# Patient Record
Sex: FEMALE | Race: WHITE | Employment: OTHER | URBAN - NONMETROPOLITAN AREA
[De-identification: names, ages, dates, MRNs, and addresses within clinical notes are randomized per-mention and may not be internally consistent; named-entity substitution may affect disease eponyms.]

---

## 2016-02-15 LAB
CHOLESTEROL, TOTAL: 208 MG/DL
CHOLESTEROL/HDL RATIO: 3.6
HDLC SERPL-MCNC: 58 MG/DL (ref 35–70)
LDL CHOLESTEROL CALCULATED: 121.6 MG/DL (ref 0–160)
TRIGL SERPL-MCNC: 142 MG/DL
VLDLC SERPL CALC-MCNC: 28 MG/DL

## 2017-03-22 VITALS — DIASTOLIC BLOOD PRESSURE: 80 MMHG | WEIGHT: 178 LBS | SYSTOLIC BLOOD PRESSURE: 112 MMHG | HEART RATE: 60 BPM

## 2017-03-22 RX ORDER — POTASSIUM CHLORIDE 750 MG/1
10 CAPSULE, EXTENDED RELEASE ORAL 2 TIMES DAILY
COMMUNITY
End: 2017-12-13 | Stop reason: SDUPTHER

## 2017-03-22 RX ORDER — SIMVASTATIN 40 MG
40 TABLET ORAL NIGHTLY
COMMUNITY
End: 2017-12-13 | Stop reason: SDUPTHER

## 2017-03-22 RX ORDER — LATANOPROST 50 UG/ML
1 SOLUTION/ DROPS OPHTHALMIC NIGHTLY
COMMUNITY

## 2017-03-22 RX ORDER — LEVOTHYROXINE SODIUM 0.05 MG/1
50 TABLET ORAL DAILY
COMMUNITY
End: 2017-12-13 | Stop reason: SDUPTHER

## 2017-09-09 LAB
AGE FOR GFR: 60
ALT SERPL-CCNC: 47 UNITS/L
ANION GAP SERPL CALCULATED.3IONS-SCNC: 14 MMOL/L
AST SERPL-CCNC: 25 UNITS/L
BUN BLDV-MCNC: 14 MG/DL
CALCIUM SERPL-MCNC: 10.3 MG/DL
CHLORIDE BLD-SCNC: 101 MMOL/L
CHOLESTEROL/HDL RATIO: 3.4 RATIO
CHOLESTEROL: 202 MG/DL
CO2: 30 MMOL/L
CREAT SERPL-MCNC: 0.7 MG/DL
EGFR BF: 103 ML/MIN/1.73 M2
EGFR BM: 139 ML/MIN/1.73 M2
EGFR WF: 85 ML/MIN/1.73 M2
EGFR WM: 115 ML/MIN/1.73 M2
GLUCOSE: 106 MG/DL
HDL, DIRECT: 59 MG/DL
LDL CHOLESTEROL CALCULATED: 107.6 MG/DL
POTASSIUM SERPL-SCNC: 3.5 MMOL/L
SODIUM BLD-SCNC: 141 MMOL/L
T4 FREE: 0.92 NG/DL
TRIGL SERPL-MCNC: 177 MG/DL
TSH SERPL DL<=0.05 MIU/L-ACNC: 1.66 MIU/ML
VLDLC SERPL CALC-MCNC: 35 MG/DL

## 2017-09-15 ENCOUNTER — OFFICE VISIT (OUTPATIENT)
Dept: FAMILY MEDICINE CLINIC | Age: 62
End: 2017-09-15
Payer: COMMERCIAL

## 2017-09-15 VITALS
WEIGHT: 177 LBS | DIASTOLIC BLOOD PRESSURE: 70 MMHG | SYSTOLIC BLOOD PRESSURE: 110 MMHG | HEIGHT: 64 IN | HEART RATE: 72 BPM | BODY MASS INDEX: 30.22 KG/M2

## 2017-09-15 DIAGNOSIS — I10 ESSENTIAL HYPERTENSION: ICD-10-CM

## 2017-09-15 DIAGNOSIS — Z12.11 SCREEN FOR COLON CANCER: ICD-10-CM

## 2017-09-15 DIAGNOSIS — Z00.00 WELL ADULT EXAM: Primary | ICD-10-CM

## 2017-09-15 DIAGNOSIS — Z12.31 ENCOUNTER FOR SCREENING MAMMOGRAM FOR MALIGNANT NEOPLASM OF BREAST: ICD-10-CM

## 2017-09-15 DIAGNOSIS — Z23 NEED FOR PROPHYLACTIC VACCINATION AND INOCULATION AGAINST VARICELLA: ICD-10-CM

## 2017-09-15 PROCEDURE — 99396 PREV VISIT EST AGE 40-64: CPT | Performed by: FAMILY MEDICINE

## 2017-09-15 RX ORDER — ATENOLOL AND CHLORTHALIDONE TABLET 50; 25 MG/1; MG/1
1 TABLET ORAL DAILY
Qty: 90 TABLET | Refills: 3 | Status: SHIPPED | OUTPATIENT
Start: 2017-09-15 | End: 2018-03-29 | Stop reason: SDUPTHER

## 2017-09-28 LAB — PAP AGE BASED: NORMAL

## 2017-12-13 RX ORDER — POTASSIUM CHLORIDE 750 MG/1
CAPSULE, EXTENDED RELEASE ORAL
Qty: 180 CAPSULE | Refills: 2 | Status: SHIPPED | OUTPATIENT
Start: 2017-12-13 | End: 2018-03-29 | Stop reason: SDUPTHER

## 2017-12-13 RX ORDER — LEVOTHYROXINE SODIUM 0.05 MG/1
TABLET ORAL
Qty: 90 TABLET | Refills: 3 | Status: SHIPPED | OUTPATIENT
Start: 2017-12-13 | End: 2018-03-29 | Stop reason: SDUPTHER

## 2017-12-13 RX ORDER — SIMVASTATIN 40 MG
TABLET ORAL
Qty: 90 TABLET | Refills: 3 | Status: SHIPPED | OUTPATIENT
Start: 2017-12-13 | End: 2018-03-29 | Stop reason: SDUPTHER

## 2018-02-01 ENCOUNTER — TELEPHONE (OUTPATIENT)
Dept: FAMILY MEDICINE CLINIC | Age: 63
End: 2018-02-01

## 2018-03-29 ENCOUNTER — OFFICE VISIT (OUTPATIENT)
Dept: FAMILY MEDICINE CLINIC | Age: 63
End: 2018-03-29
Payer: COMMERCIAL

## 2018-03-29 VITALS
DIASTOLIC BLOOD PRESSURE: 80 MMHG | HEIGHT: 64 IN | BODY MASS INDEX: 27.49 KG/M2 | HEART RATE: 80 BPM | SYSTOLIC BLOOD PRESSURE: 118 MMHG | WEIGHT: 161 LBS

## 2018-03-29 DIAGNOSIS — E03.9 ACQUIRED HYPOTHYROIDISM: ICD-10-CM

## 2018-03-29 DIAGNOSIS — E78.2 MIXED HYPERLIPIDEMIA: ICD-10-CM

## 2018-03-29 DIAGNOSIS — I10 ESSENTIAL HYPERTENSION: Primary | ICD-10-CM

## 2018-03-29 PROCEDURE — 3017F COLORECTAL CA SCREEN DOC REV: CPT | Performed by: FAMILY MEDICINE

## 2018-03-29 PROCEDURE — 99214 OFFICE O/P EST MOD 30 MIN: CPT | Performed by: FAMILY MEDICINE

## 2018-03-29 PROCEDURE — G8419 CALC BMI OUT NRM PARAM NOF/U: HCPCS | Performed by: FAMILY MEDICINE

## 2018-03-29 PROCEDURE — 1036F TOBACCO NON-USER: CPT | Performed by: FAMILY MEDICINE

## 2018-03-29 PROCEDURE — 3014F SCREEN MAMMO DOC REV: CPT | Performed by: FAMILY MEDICINE

## 2018-03-29 PROCEDURE — G8427 DOCREV CUR MEDS BY ELIG CLIN: HCPCS | Performed by: FAMILY MEDICINE

## 2018-03-29 PROCEDURE — G8484 FLU IMMUNIZE NO ADMIN: HCPCS | Performed by: FAMILY MEDICINE

## 2018-03-29 RX ORDER — POTASSIUM CHLORIDE 750 MG/1
20 CAPSULE, EXTENDED RELEASE ORAL DAILY
Qty: 180 CAPSULE | Refills: 3 | Status: SHIPPED | OUTPATIENT
Start: 2018-03-29 | End: 2019-04-10 | Stop reason: SDUPTHER

## 2018-03-29 RX ORDER — LEVOTHYROXINE SODIUM 0.05 MG/1
50 TABLET ORAL DAILY
Qty: 90 TABLET | Refills: 3 | Status: SHIPPED | OUTPATIENT
Start: 2018-03-29 | End: 2019-04-10 | Stop reason: SDUPTHER

## 2018-03-29 RX ORDER — ATORVASTATIN CALCIUM 40 MG/1
40 TABLET, FILM COATED ORAL DAILY
Qty: 90 TABLET | Refills: 3 | Status: SHIPPED | OUTPATIENT
Start: 2018-03-29 | End: 2018-11-01 | Stop reason: ALTCHOICE

## 2018-03-29 RX ORDER — ATENOLOL AND CHLORTHALIDONE TABLET 50; 25 MG/1; MG/1
1 TABLET ORAL DAILY
Qty: 90 TABLET | Refills: 3 | Status: SHIPPED | OUTPATIENT
Start: 2018-03-29 | End: 2019-04-10 | Stop reason: SDUPTHER

## 2018-03-29 RX ORDER — SIMVASTATIN 40 MG
TABLET ORAL
Qty: 90 TABLET | Refills: 3 | Status: SHIPPED | OUTPATIENT
Start: 2018-03-29 | End: 2018-03-29

## 2018-03-29 ASSESSMENT — PATIENT HEALTH QUESTIONNAIRE - PHQ9
SUM OF ALL RESPONSES TO PHQ9 QUESTIONS 1 & 2: 0
2. FEELING DOWN, DEPRESSED OR HOPELESS: 0
SUM OF ALL RESPONSES TO PHQ QUESTIONS 1-9: 0
1. LITTLE INTEREST OR PLEASURE IN DOING THINGS: 0

## 2018-03-29 ASSESSMENT — ENCOUNTER SYMPTOMS
SHORTNESS OF BREATH: 0
DIARRHEA: 0
ORTHOPNEA: 0
ABDOMINAL PAIN: 0
BLURRED VISION: 0
CONSTIPATION: 0

## 2018-03-29 NOTE — PROGRESS NOTES
frequency and urgency. Musculoskeletal: Negative for arthralgias and myalgias. Neurological: Negative for dizziness and light-headedness. Psychiatric/Behavioral: Negative for sleep disturbance. Objective:     /80   Pulse 80   Ht 5' 3.5\" (1.613 m)   Wt 161 lb (73 kg)   BMI 28.07 kg/m²     Physical Exam   Constitutional: She is oriented to person, place, and time. She appears well-developed and well-nourished. HENT:   Head: Normocephalic and atraumatic. Eyes: Conjunctivae and EOM are normal.   Neck: Normal range of motion. Neck supple. No JVD present. No thyromegaly present. Cardiovascular: Normal rate, regular rhythm and intact distal pulses. Exam reveals no gallop and no friction rub. No murmur heard. Pulmonary/Chest: Effort normal and breath sounds normal. No respiratory distress. Abdominal: Soft. Musculoskeletal: She exhibits no edema. Lymphadenopathy:     She has no cervical adenopathy. Neurological: She is alert and oriented to person, place, and time. Skin: Skin is warm. Psychiatric: She has a normal mood and affect. Her behavior is normal. Judgment and thought content normal.   Nursing note and vitals reviewed. Assessment/Plan:     1. Essential hypertension  atenolol-chlorthalidone (TENORETIC 50) 50-25 MG per tablet   2. Mixed hyperlipidemia  atorvastatin (LIPITOR) 40 MG tablet   3. Acquired hypothyroidism       Will change to the atorvastatin in light of the \"free\" promo with area drug store. Also referred to area Health screenings- dates given for low cost labs this fall. Will do well visit in the fall and recheck of the labs at that time. Encouraged on her current healthy habits.     Lab Results   Component Value Date    CHOL 202 (H) 09/09/2017    TRIG 177 09/09/2017    HDL 58 02/15/2016    ALT 47 09/09/2017    AST 25 09/09/2017     09/09/2017    K 3.5 (L) 09/09/2017     09/09/2017    CREATININE 0.7 09/09/2017    BUN 14 09/09/2017    CO2 30

## 2018-08-23 LAB — GLUCOSE FASTING: 123 MG/DL

## 2018-09-25 ENCOUNTER — TELEPHONE (OUTPATIENT)
Dept: FAMILY MEDICINE CLINIC | Age: 63
End: 2018-09-25

## 2018-09-25 DIAGNOSIS — R74.8 ABNORMAL LIVER ENZYMES: Primary | ICD-10-CM

## 2018-10-01 LAB
A/G RATIO: 1.3 RATIO
ALBUMIN: 4.4 G/DL
ALK PHOSPHATASE: 306 UNITS/L
ALT SERPL-CCNC: 253 UNITS/L
AST SERPL-CCNC: 142 UNITS/L
BILIRUB SERPL-MCNC: 0.6 MG/DL
BILIRUBIN DIRECT: 0 MG/DL
GLOBULIN: 3.5 G/DL
TOTAL PROTEIN: 7.9 G/DL

## 2018-10-04 ENCOUNTER — OFFICE VISIT (OUTPATIENT)
Dept: FAMILY MEDICINE CLINIC | Age: 63
End: 2018-10-04
Payer: COMMERCIAL

## 2018-10-04 VITALS
HEART RATE: 66 BPM | WEIGHT: 167 LBS | BODY MASS INDEX: 29.12 KG/M2 | SYSTOLIC BLOOD PRESSURE: 124 MMHG | DIASTOLIC BLOOD PRESSURE: 82 MMHG

## 2018-10-04 DIAGNOSIS — I10 ESSENTIAL HYPERTENSION: ICD-10-CM

## 2018-10-04 DIAGNOSIS — E78.2 MIXED HYPERLIPIDEMIA: ICD-10-CM

## 2018-10-04 DIAGNOSIS — R79.89 ABNORMAL LIVER FUNCTION TEST: Primary | ICD-10-CM

## 2018-10-04 PROCEDURE — G8484 FLU IMMUNIZE NO ADMIN: HCPCS | Performed by: FAMILY MEDICINE

## 2018-10-04 PROCEDURE — 99214 OFFICE O/P EST MOD 30 MIN: CPT | Performed by: FAMILY MEDICINE

## 2018-10-04 PROCEDURE — G8419 CALC BMI OUT NRM PARAM NOF/U: HCPCS | Performed by: FAMILY MEDICINE

## 2018-10-04 PROCEDURE — 1036F TOBACCO NON-USER: CPT | Performed by: FAMILY MEDICINE

## 2018-10-04 PROCEDURE — 3017F COLORECTAL CA SCREEN DOC REV: CPT | Performed by: FAMILY MEDICINE

## 2018-10-04 PROCEDURE — G8427 DOCREV CUR MEDS BY ELIG CLIN: HCPCS | Performed by: FAMILY MEDICINE

## 2018-10-04 ASSESSMENT — ENCOUNTER SYMPTOMS
RESPIRATORY NEGATIVE: 1
GASTROINTESTINAL NEGATIVE: 1

## 2018-10-04 NOTE — PROGRESS NOTES
back pain    Kidney Disease Brother     Cancer Brother         lung       Social History   Substance Use Topics    Smoking status: Never Smoker    Smokeless tobacco: Never Used    Alcohol use No      Current Outpatient Prescriptions   Medication Sig Dispense Refill    levothyroxine (SYNTHROID) 50 MCG tablet Take 1 tablet by mouth daily 90 tablet 3    potassium chloride (MICRO-K) 10 MEQ extended release capsule Take 2 capsules by mouth daily 180 capsule 3    atenolol-chlorthalidone (TENORETIC 50) 50-25 MG per tablet Take 1 tablet by mouth daily 90 tablet 3    atorvastatin (LIPITOR) 40 MG tablet Take 1 tablet by mouth daily 90 tablet 3    latanoprost (XALATAN) 0.005 % ophthalmic solution 1 drop nightly      Nutritional Supplements (ESTROVEN NIGHTTIME PO) Take by mouth       No current facility-administered medications for this visit. Allergies   Allergen Reactions    Pneumococcal Vaccines      Arm swelling , red       Health Maintenance   Topic Date Due    Hepatitis C screen  1955    HIV screen  09/22/1971    Diabetes screen  09/22/1996    Shingles Vaccine (1 of 2 - 2 Dose Series) 09/22/2006    Colon cancer screen colonoscopy  09/22/2006    Cervical cancer screen  12/21/2015    Breast cancer screen  02/15/2018    Flu vaccine (1) 09/01/2018    DTaP/Tdap/Td vaccine (2 - Tdap) 09/25/2019    Lipid screen  08/23/2023       Subjective:      Review of Systems  Constitutional: Negative. Respiratory: Negative. Cardiovascular: Negative. Gastrointestinal: Negative. Genitourinary: Negative. Musculoskeletal: Positive for myalgias (right leg starts at the top and radiates down). Neurological: Negative. Objective:     /82   Pulse 66   Wt 167 lb (75.8 kg)   BMI 29.12 kg/m²     Physical Exam   Constitutional: She is oriented to person, place, and time. She appears well-developed and well-nourished. HENT:   Head: Normocephalic and atraumatic.    Eyes: Conjunctivae and EOM prescribed medications. All patient questions answered. Pt voiced understanding. Reviewed health maintenance. Instructed to continue current medications, diet and exercise. Patient agreed with treatment plan. Follow up as directed.      Electronically signed by Jacinta Ward MD on 10/4/2018

## 2018-10-12 LAB
A/G RATIO: 1.2 RATIO
ALBUMIN: 4.8 G/DL
ALK PHOSPHATASE: 275 UNITS/L
ALT SERPL-CCNC: 191 UNITS/L
AST SERPL-CCNC: 94 UNITS/L
BILIRUB SERPL-MCNC: 0.8 MG/DL
BILIRUBIN DIRECT: 0 MG/DL
GLOBULIN: 4 G/DL
HAV IGM SER IA-ACNC: NORMAL
HEPATITIS B CORE IGM ANTIBODY: NORMAL
HEPATITIS B SURFACE ANTIGEN: NORMAL
HEPATITIS C IGG: NORMAL
Lab: NORMAL
SIGNAL/CUTOFF: NORMAL
TOTAL PROTEIN: 8.8 G/DL

## 2018-10-26 LAB
% SATURATION: 37 %
ABNORMAL PROTEIN BAND 1: NORMAL
ABNORMAL PROTEIN BAND 2: NORMAL
ABNORMAL PROTEIN BAND 3: NORMAL
ADDITIONAL TESTING: NORMAL
ALBUMIN: NORMAL
ALPHA 1 GLOBULIN: NORMAL
ALPHA 2 GLOBULIN: NORMAL
ANA SCREEN: NORMAL
BETA GLOBULIN: NORMAL
GAMMA GLOBULIN: NORMAL
INTERPRETATION: NORMAL
IRON: 121 MG/DL
TOTAL IRON BINDING CAPACITY: 327 MG/DL
TSH REFLEX FT4: 2.21 MIU/ML

## 2018-11-01 ENCOUNTER — OFFICE VISIT (OUTPATIENT)
Dept: FAMILY MEDICINE CLINIC | Age: 63
End: 2018-11-01
Payer: COMMERCIAL

## 2018-11-01 VITALS
SYSTOLIC BLOOD PRESSURE: 122 MMHG | HEART RATE: 78 BPM | BODY MASS INDEX: 29.12 KG/M2 | DIASTOLIC BLOOD PRESSURE: 76 MMHG | WEIGHT: 167 LBS

## 2018-11-01 DIAGNOSIS — R94.5 ABNORMAL LIVER FUNCTION: Primary | ICD-10-CM

## 2018-11-01 PROCEDURE — G8419 CALC BMI OUT NRM PARAM NOF/U: HCPCS | Performed by: FAMILY MEDICINE

## 2018-11-01 PROCEDURE — 99214 OFFICE O/P EST MOD 30 MIN: CPT | Performed by: FAMILY MEDICINE

## 2018-11-01 PROCEDURE — G8484 FLU IMMUNIZE NO ADMIN: HCPCS | Performed by: FAMILY MEDICINE

## 2018-11-01 PROCEDURE — 3017F COLORECTAL CA SCREEN DOC REV: CPT | Performed by: FAMILY MEDICINE

## 2018-11-01 PROCEDURE — G8427 DOCREV CUR MEDS BY ELIG CLIN: HCPCS | Performed by: FAMILY MEDICINE

## 2018-11-01 PROCEDURE — 1036F TOBACCO NON-USER: CPT | Performed by: FAMILY MEDICINE

## 2018-11-04 ASSESSMENT — ENCOUNTER SYMPTOMS
CHEST TIGHTNESS: 0
CHOKING: 0
COUGH: 0
SHORTNESS OF BREATH: 0

## 2019-03-01 LAB
A/G RATIO: 1.4 RATIO
ALBUMIN: 4.8 G/DL
ALK PHOSPHATASE: 90 UNITS/L
ALT SERPL-CCNC: 36 UNITS/L
AST SERPL-CCNC: 31 UNITS/L
BILIRUB SERPL-MCNC: 0.8 MG/DL
BILIRUBIN DIRECT: 0 MG/DL
GLOBULIN: 3.5 G/DL
TOTAL PROTEIN: 8.3 G/DL

## 2019-04-10 DIAGNOSIS — I10 ESSENTIAL HYPERTENSION: ICD-10-CM

## 2019-04-10 RX ORDER — ATENOLOL AND CHLORTHALIDONE TABLET 50; 25 MG/1; MG/1
1 TABLET ORAL DAILY
Qty: 90 TABLET | Refills: 3 | Status: SHIPPED | OUTPATIENT
Start: 2019-04-10 | End: 2020-04-27

## 2019-04-10 RX ORDER — LEVOTHYROXINE SODIUM 0.05 MG/1
50 TABLET ORAL DAILY
Qty: 90 TABLET | Refills: 3 | Status: SHIPPED | OUTPATIENT
Start: 2019-04-10 | End: 2020-04-06

## 2019-04-10 RX ORDER — POTASSIUM CHLORIDE 750 MG/1
20 CAPSULE, EXTENDED RELEASE ORAL DAILY
Qty: 180 CAPSULE | Refills: 3 | Status: SHIPPED | OUTPATIENT
Start: 2019-04-10 | End: 2020-04-06

## 2019-04-10 NOTE — TELEPHONE ENCOUNTER
John Matson is calling to request a refill on the following medication(s):  Requested Prescriptions     Pending Prescriptions Disp Refills    atenolol-chlorthalidone (TENORETIC 50) 50-25 MG per tablet 90 tablet 3     Sig: Take 1 tablet by mouth daily    levothyroxine (SYNTHROID) 50 MCG tablet 90 tablet 3     Sig: Take 1 tablet by mouth daily    potassium chloride (MICRO-K) 10 MEQ extended release capsule 180 capsule 3     Sig: Take 2 capsules by mouth daily       Last Visit Date (If Applicable):  05/0/2386    Next Visit Date:    5/9/2019

## 2019-05-29 RX ORDER — ATORVASTATIN CALCIUM 40 MG/1
40 TABLET, FILM COATED ORAL
COMMUNITY
Start: 2018-03-29 | End: 2019-05-30 | Stop reason: SINTOL

## 2019-05-30 ENCOUNTER — OFFICE VISIT (OUTPATIENT)
Dept: FAMILY MEDICINE CLINIC | Age: 64
End: 2019-05-30
Payer: COMMERCIAL

## 2019-05-30 VITALS
BODY MASS INDEX: 29.41 KG/M2 | HEART RATE: 55 BPM | WEIGHT: 166 LBS | OXYGEN SATURATION: 98 % | HEIGHT: 63 IN | SYSTOLIC BLOOD PRESSURE: 104 MMHG | DIASTOLIC BLOOD PRESSURE: 78 MMHG

## 2019-05-30 DIAGNOSIS — E78.2 MIXED HYPERLIPIDEMIA: ICD-10-CM

## 2019-05-30 DIAGNOSIS — R94.5 ABNORMAL LIVER FUNCTION: ICD-10-CM

## 2019-05-30 DIAGNOSIS — Z12.31 ENCOUNTER FOR SCREENING MAMMOGRAM FOR MALIGNANT NEOPLASM OF BREAST: ICD-10-CM

## 2019-05-30 DIAGNOSIS — R73.01 IMPAIRED FASTING GLUCOSE: ICD-10-CM

## 2019-05-30 DIAGNOSIS — I10 ESSENTIAL HYPERTENSION: ICD-10-CM

## 2019-05-30 PROBLEM — H40.1133 PRIMARY OPEN ANGLE GLAUCOMA OF BOTH EYES, SEVERE STAGE: Status: ACTIVE | Noted: 2017-01-20

## 2019-05-30 PROCEDURE — 99214 OFFICE O/P EST MOD 30 MIN: CPT | Performed by: FAMILY MEDICINE

## 2019-05-30 RX ORDER — ATORVASTATIN CALCIUM 20 MG/1
20 TABLET, FILM COATED ORAL DAILY
Qty: 90 TABLET | Refills: 1
Start: 2019-05-30 | End: 2019-11-25 | Stop reason: SINTOL

## 2019-05-30 RX ORDER — LATANOPROST 50 UG/ML
1 SOLUTION/ DROPS OPHTHALMIC
COMMUNITY
Start: 2019-02-18 | End: 2019-05-30 | Stop reason: SDUPTHER

## 2019-05-30 ASSESSMENT — PATIENT HEALTH QUESTIONNAIRE - PHQ9
2. FEELING DOWN, DEPRESSED OR HOPELESS: 0
SUM OF ALL RESPONSES TO PHQ QUESTIONS 1-9: 0
SUM OF ALL RESPONSES TO PHQ9 QUESTIONS 1 & 2: 0
1. LITTLE INTEREST OR PLEASURE IN DOING THINGS: 0
SUM OF ALL RESPONSES TO PHQ QUESTIONS 1-9: 0

## 2019-05-30 ASSESSMENT — ENCOUNTER SYMPTOMS
SHORTNESS OF BREATH: 0
DIARRHEA: 0
ABDOMINAL PAIN: 0
CONSTIPATION: 0

## 2019-05-30 NOTE — PROGRESS NOTES
Review of Systems   Constitutional: Negative for activity change, appetite change and fatigue. Respiratory: Negative for shortness of breath. Cardiovascular: Negative for chest pain and palpitations. Gastrointestinal: Negative for abdominal pain, constipation and diarrhea. Genitourinary: Negative for frequency and urgency. Neurological: Negative for dizziness and light-headedness.     doing well

## 2019-06-02 ASSESSMENT — ENCOUNTER SYMPTOMS
ORTHOPNEA: 0
BLURRED VISION: 0
SHORTNESS OF BREATH: 0

## 2019-08-03 LAB
AST SERPL-CCNC: 105 U/L
AST SERPL-CCNC: 72 U/L
AVERAGE GLUCOSE: NORMAL
BUN BLDV-MCNC: 15 MG/DL
CALCIUM SERPL-MCNC: 10.1 MG/DL
CHLORIDE BLD-SCNC: 98 MMOL/L
CHOLESTEROL, TOTAL: 244 MG/DL
CHOLESTEROL/HDL RATIO: 3.6
CO2: 27 MMOL/L
CREAT SERPL-MCNC: 0.61 MG/DL
GFR CALCULATED: >60
GLUCOSE BLD-MCNC: 115 MG/DL
HBA1C MFR BLD: 6.3 %
HDLC SERPL-MCNC: 68 MG/DL (ref 35–70)
LDL CHOLESTEROL CALCULATED: 149 MG/DL (ref 0–160)
POTASSIUM SERPL-SCNC: 3.2 MMOL/L
SODIUM BLD-SCNC: 139 MMOL/L
TRIGL SERPL-MCNC: 135 MG/DL
TSH SERPL DL<=0.05 MIU/L-ACNC: 1.95 UIU/ML
VLDLC SERPL CALC-MCNC: NORMAL MG/DL

## 2019-11-25 ENCOUNTER — HOSPITAL ENCOUNTER (OUTPATIENT)
Age: 64
Setting detail: SPECIMEN
Discharge: HOME | End: 2019-11-25
Payer: COMMERCIAL

## 2019-11-25 ENCOUNTER — OFFICE VISIT (OUTPATIENT)
Dept: FAMILY MEDICINE CLINIC | Age: 64
End: 2019-11-25
Payer: COMMERCIAL

## 2019-11-25 VITALS
HEIGHT: 64 IN | OXYGEN SATURATION: 98 % | DIASTOLIC BLOOD PRESSURE: 80 MMHG | HEART RATE: 53 BPM | SYSTOLIC BLOOD PRESSURE: 132 MMHG | WEIGHT: 165 LBS | BODY MASS INDEX: 28.17 KG/M2

## 2019-11-25 DIAGNOSIS — E78.2 MIXED HYPERLIPIDEMIA: ICD-10-CM

## 2019-11-25 DIAGNOSIS — Z23 NEED FOR PROPHYLACTIC VACCINATION AGAINST DIPHTHERIA-TETANUS-PERTUSSIS (DTP): ICD-10-CM

## 2019-11-25 DIAGNOSIS — Z23 NEED FOR PROPHYLACTIC VACCINATION AND INOCULATION AGAINST VARICELLA: ICD-10-CM

## 2019-11-25 DIAGNOSIS — E87.5 HYPERKALEMIA: ICD-10-CM

## 2019-11-25 DIAGNOSIS — R74.8 ELEVATED LIVER ENZYMES: ICD-10-CM

## 2019-11-25 DIAGNOSIS — Z12.31 VISIT FOR SCREENING MAMMOGRAM: ICD-10-CM

## 2019-11-25 DIAGNOSIS — Z01.419 PAP SMEAR, AS PART OF ROUTINE GYNECOLOGICAL EXAMINATION: ICD-10-CM

## 2019-11-25 DIAGNOSIS — Z01.419 PAP SMEAR, AS PART OF ROUTINE GYNECOLOGICAL EXAMINATION: Primary | ICD-10-CM

## 2019-11-25 DIAGNOSIS — Z12.11 SCREENING FOR COLON CANCER: ICD-10-CM

## 2019-11-25 PROBLEM — H21.561 AFFERENT PUPILLARY DEFECT OF RIGHT EYE: Status: ACTIVE | Noted: 2019-06-24

## 2019-11-25 PROCEDURE — 87624 HPV HI-RISK TYP POOLED RSLT: CPT

## 2019-11-25 PROCEDURE — 90715 TDAP VACCINE 7 YRS/> IM: CPT | Performed by: FAMILY MEDICINE

## 2019-11-25 PROCEDURE — 99396 PREV VISIT EST AGE 40-64: CPT | Performed by: FAMILY MEDICINE

## 2019-11-25 PROCEDURE — 90471 IMMUNIZATION ADMIN: CPT | Performed by: FAMILY MEDICINE

## 2019-11-25 PROCEDURE — G0145 SCR C/V CYTO,THINLAYER,RESCR: HCPCS

## 2019-11-27 LAB
HPV SAMPLE: NORMAL
HPV, GENOTYPE 16: NOT DETECTED
HPV, GENOTYPE 18: NOT DETECTED
HPV, HIGH RISK OTHER: NOT DETECTED
HPV, INTERPRETATION: NORMAL
SPECIMEN DESCRIPTION: NORMAL

## 2019-11-29 LAB — CYTOLOGY REPORT: NORMAL

## 2020-02-12 LAB
ALT SERPL-CCNC: 20 U/L
AST SERPL-CCNC: 21 U/L
AVERAGE GLUCOSE: NORMAL
BUN BLDV-MCNC: 23 MG/DL
CALCIUM SERPL-MCNC: 9.6 MG/DL
CHLORIDE BLD-SCNC: 99 MMOL/L
CHOLESTEROL, TOTAL: 319 MG/DL
CHOLESTEROL/HDL RATIO: 5.7
CO2: 25 MMOL/L
CREAT SERPL-MCNC: 0.65 MG/DL
GFR CALCULATED: >60
GLUCOSE BLD-MCNC: 113 MG/DL
HBA1C MFR BLD: 6.1 %
HDLC SERPL-MCNC: 56 MG/DL (ref 35–70)
LDL CHOLESTEROL CALCULATED: 223 MG/DL (ref 0–160)
POTASSIUM SERPL-SCNC: 3.3 MMOL/L
SODIUM BLD-SCNC: 141 MMOL/L
TRIGL SERPL-MCNC: 201 MG/DL
VLDLC SERPL CALC-MCNC: ABNORMAL MG/DL

## 2020-04-06 RX ORDER — LEVOTHYROXINE SODIUM 0.05 MG/1
TABLET ORAL
Qty: 90 TABLET | Refills: 2 | Status: SHIPPED | OUTPATIENT
Start: 2020-04-06 | End: 2020-12-28

## 2020-04-06 RX ORDER — POTASSIUM CHLORIDE 750 MG/1
CAPSULE, EXTENDED RELEASE ORAL
Qty: 180 CAPSULE | Refills: 2 | Status: SHIPPED | OUTPATIENT
Start: 2020-04-06 | End: 2020-08-07 | Stop reason: ALTCHOICE

## 2020-04-27 RX ORDER — ATENOLOL AND CHLORTHALIDONE TABLET 50; 25 MG/1; MG/1
TABLET ORAL
Qty: 90 TABLET | Refills: 2 | Status: SHIPPED | OUTPATIENT
Start: 2020-04-27 | End: 2020-08-07 | Stop reason: ALTCHOICE

## 2020-04-27 NOTE — TELEPHONE ENCOUNTER
Eneida Padilla is requesting a refill on the following medication(s):  Requested Prescriptions     Pending Prescriptions Disp Refills    atenolol-chlorthalidone (TENORETIC) 50-25 MG per tablet [Pharmacy Med Name: ATENOLOL-CHLORTHALIDONE 50-25] 90 tablet 2     Sig: TAKE ONE TABLET BY MOUTH DAILY       Last Visit Date (If Applicable):  09/03/8362    Next Visit Date:    5/28/2020

## 2020-08-07 ENCOUNTER — OFFICE VISIT (OUTPATIENT)
Dept: FAMILY MEDICINE CLINIC | Age: 65
End: 2020-08-07
Payer: COMMERCIAL

## 2020-08-07 VITALS
SYSTOLIC BLOOD PRESSURE: 128 MMHG | OXYGEN SATURATION: 97 % | HEART RATE: 57 BPM | BODY MASS INDEX: 30.34 KG/M2 | WEIGHT: 174 LBS | DIASTOLIC BLOOD PRESSURE: 82 MMHG

## 2020-08-07 PROCEDURE — 99214 OFFICE O/P EST MOD 30 MIN: CPT | Performed by: FAMILY MEDICINE

## 2020-08-07 RX ORDER — MAGNESIUM GLUCONATE 30 MG(550)
30 TABLET ORAL 2 TIMES DAILY
COMMUNITY
End: 2020-08-07 | Stop reason: ALTCHOICE

## 2020-08-07 RX ORDER — ROSUVASTATIN CALCIUM 20 MG/1
10 TABLET, COATED ORAL NIGHTLY
Qty: 45 TABLET | Refills: 3 | Status: SHIPPED | OUTPATIENT
Start: 2020-08-07 | End: 2021-08-04

## 2020-08-07 RX ORDER — SPIRONOLACTONE 25 MG/1
25 TABLET ORAL DAILY
Qty: 90 TABLET | Refills: 3 | Status: SHIPPED | OUTPATIENT
Start: 2020-08-07 | End: 2021-08-04

## 2020-08-07 RX ORDER — METOPROLOL SUCCINATE 50 MG/1
50 TABLET, EXTENDED RELEASE ORAL DAILY
Qty: 90 TABLET | Refills: 1 | Status: SHIPPED | OUTPATIENT
Start: 2020-08-07 | End: 2021-01-25

## 2020-08-07 ASSESSMENT — ENCOUNTER SYMPTOMS
ABDOMINAL PAIN: 0
SHORTNESS OF BREATH: 0
COUGH: 0
WHEEZING: 0

## 2020-08-07 NOTE — PROGRESS NOTES
Review of Systems   Constitutional: Negative for fatigue and fever. Respiratory: Negative for cough, shortness of breath and wheezing. Cardiovascular: Negative for chest pain, palpitations and leg swelling. Gastrointestinal: Negative for abdominal pain. Endocrine: Negative for polydipsia. Genitourinary: Negative for frequency and urgency. Neurological: Negative for dizziness and headaches.

## 2020-08-07 NOTE — PROGRESS NOTES
[address] [facility]  [address]  Dept: [phone]  Dept IPR:[phone]    Baldo Becerra is a 62 y.o. female who presents today for her medical conditions/complaints as notedbelow. Baldo Becerra is c/o of 6 Month Follow-Up and Hypertension      HPI:     Hypertension   This is a chronic problem. The current episode started more than 1 year ago. The problem is unchanged. The problem is controlled. Pertinent negatives include no anxiety, blurred vision, chest pain, headaches, malaise/fatigue, neck pain, orthopnea, palpitations, peripheral edema, PND, shortness of breath or sweats. There are no associated agents to hypertension. Risk factors for coronary artery disease include dyslipidemia, post-menopausal state and sedentary lifestyle. Past treatments include beta blockers and diuretics. The current treatment provides significant improvement. Compliance problems include exercise. There is no history of angina, kidney disease, CAD/MI, CVA, heart failure, left ventricular hypertrophy, PVD or retinopathy. Has been off of the atorvastatin-- really has done OK off of it, not sure she really feels that much different off of it.     BP Readings from Last 3 Encounters:   08/07/20 128/82   11/25/19 132/80   05/30/19 104/78          (goal 120/80)    Wt Readings from Last 3 Encounters:   08/07/20 174 lb (78.9 kg)   11/25/19 165 lb (74.8 kg)   05/30/19 166 lb (75.3 kg)        Past Medical History:   Diagnosis Date    Hyperlipidemia     Hypertension     Hyperthyroidism     IFG (impaired fasting glucose)     Migraine     unspecified not intractable with out status migrainosus     Mixed hyperlipidemia 10/4/2018      Past Surgical History:   Procedure Laterality Date    CHOLECYSTECTOMY  1980    OVARIAN CYST DRAINAGE      SPLENECTOMY  2001       Family History   Problem Relation Age of Onset    Heart Disease Mother     Osteoporosis Mother    [facility] Other Mother         blood disorder    Heart Attack Father     Other Sister         back pain    Kidney Disease Brother     Cancer Brother         lung    Rheum Arthritis Daughter        Social History     Tobacco Use    Smoking status: Never Smoker    Smokeless tobacco: Never Used   Substance Use Topics    Alcohol use: No      Current Outpatient Medications   Medication Sig Dispense Refill    rosuvastatin (CRESTOR) 20 MG tablet Take 0.5 tablets by mouth nightly 45 tablet 3    metoprolol succinate (TOPROL XL) 50 MG extended release tablet Take 1 tablet by mouth daily 90 tablet 1    spironolactone (ALDACTONE) 25 MG tablet Take 1 tablet by mouth daily 90 tablet 3    levothyroxine (SYNTHROID) 50 MCG tablet TAKE ONE TABLET BY MOUTH DAILY 90 tablet 2    latanoprost (XALATAN) 0.005 % ophthalmic solution 1 drop nightly       No current facility-administered medications for this visit. Allergies   Allergen Reactions    Atorvastatin      Elevated LFT's    Pneumococcal Vaccines      Arm swelling , red       Health Maintenance   Topic Date Due    Hepatitis C screen  1955    Meningococcal (ACWY) vaccine (1 - Risk start before 7 months 4-dose series) 1956    Hib vaccine (1 of 1 - Risk 1-dose series) 12/22/1957    Meningococcal B vaccine (1 of 4 - Increased Risk Bexsero 2-dose series) 09/22/1966    HIV screen  09/22/1971    Shingles Vaccine (1 of 2) 09/22/2006    Colon cancer screen colonoscopy  09/22/2006    Breast cancer screen  02/15/2018    A1C test (Diabetic or Prediabetic)  08/03/2020    Potassium monitoring  08/03/2020    Creatinine monitoring  08/03/2020    Lipid screen  08/03/2020    Flu vaccine (1) 09/01/2020    Cervical cancer screen  11/25/2024    DTaP/Tdap/Td vaccine (3 - Td) 11/25/2029    Hepatitis A vaccine  Aged Out    Hepatitis B vaccine  Aged Out       Subjective:      Review of Systems   Constitutional: Negative for malaise/fatigue. Eyes: Negative for blurred vision. Respiratory: Negative for shortness of breath. Cardiovascular: Negative for chest pain, palpitations, orthopnea and PND. Musculoskeletal: Negative for neck pain. Neurological: Negative for headaches. Constitutional: Negative for fatigue and fever. Respiratory: Negative for cough, shortness of breath and wheezing. Cardiovascular: Negative for chest pain, palpitations and leg swelling. Gastrointestinal: Negative for abdominal pain. Endocrine: Negative for polydipsia. Genitourinary: Negative for frequency and urgency. Neurological: Negative for dizziness and headaches. Objective:     /82   Pulse 57   Wt 174 lb (78.9 kg)   SpO2 97%   BMI 30.34 kg/m²     Physical Exam  Vitals signs and nursing note reviewed. Constitutional:       Appearance: She is well-developed. HENT:      Head: Normocephalic and atraumatic. Mouth/Throat:      Mouth: Mucous membranes are moist.   Eyes:      Conjunctiva/sclera: Conjunctivae normal.   Neck:      Musculoskeletal: Normal range of motion and neck supple. Thyroid: No thyromegaly. Vascular: No JVD. Cardiovascular:      Rate and Rhythm: Normal rate and regular rhythm. Heart sounds: Normal heart sounds. No murmur. No friction rub. No gallop. Pulmonary:      Effort: Pulmonary effort is normal. No respiratory distress. Breath sounds: Normal breath sounds. Abdominal:      General: There is no distension. Palpations: Abdomen is soft. There is no mass. Tenderness: There is no abdominal tenderness. There is no guarding. Hernia: No hernia is present. Comments: Well healed surgical scars   Lymphadenopathy:      Cervical: No cervical adenopathy. Skin:     General: Skin is warm. Capillary Refill: Capillary refill takes less than 2 seconds. Neurological:      General: No focal deficit present. Mental Status: She is alert and oriented to person, place, and time.          Assessment/Plan: Diagnosis Orders   1. Essential hypertension  metoprolol succinate (TOPROL XL) 50 MG extended release tablet    spironolactone (ALDACTONE) 25 MG tablet   2. Mixed hyperlipidemia  rosuvastatin (CRESTOR) 20 MG tablet     With the history that she has with blood pressure medications causing significant hypo-kalemia and the need to take multiple potassium supplements in addition and still running a little bit low in the potassium is reviewed today we will try to make some changes in her medications to continue to control her blood pressure medications without the result hypokalemia. We will stop the Tenoretic start metoprolol and spironolactone in its place. Discussed watching her blood pressure closely over the next 2 to 3 weeks to let us know. We will also need to recheck the electrolytes BUN/creatinine in several weeks to make sure that we are balancing her electrolytes in a better fashion. For the hyperlipidemia we will continue to monitor this and will add Crestor or rosuvastatin and instead of the atorvastatin to see if she tolerates this better with fewer muscle aches and pain labs were reviewed today    Lab Results   Component Value Date    CHOL 244 08/03/2019    TRIG 135 08/03/2019    HDL 68 08/03/2019    ALT 36 03/01/2019    AST 72 08/03/2019     08/03/2019     08/03/2019    K 3.2 08/03/2019    CL 98 08/03/2019    CREATININE 0.61 08/03/2019    BUN 15 08/03/2019    CO2 27 08/03/2019    TSH 1.95 08/03/2019    GLUF 123 08/23/2018    LABA1C 6.3 08/03/2019       Return in about 6 months (around 2/7/2021) for HTN, Medication recheck. Patient given educational materials - see patientinstructions. Discussed use, benefit, and side effects of prescribed medications. All patient questions answered. Pt voiced understanding. Reviewed health maintenance. Instructed to continue current medications, diet andexercise. Patient agreed with treatment plan. Follow up as directed.      (Please note that portions of this note were completed with a voice-recognition program. Efforts were made to edit the dictation but occasionally words are mis-transcribed.)    Electronically signed by Saji Segovia MD on 8/9/2020

## 2020-08-09 ASSESSMENT — ENCOUNTER SYMPTOMS
BLURRED VISION: 0
SHORTNESS OF BREATH: 0
ORTHOPNEA: 0

## 2020-10-26 ENCOUNTER — TELEPHONE (OUTPATIENT)
Dept: FAMILY MEDICINE CLINIC | Age: 65
End: 2020-10-26

## 2020-10-26 NOTE — TELEPHONE ENCOUNTER
Patient called back and said she is not taking atenolol anymore. She made a mistake when she called [facility] and asked for a refill.

## 2020-10-26 NOTE — TELEPHONE ENCOUNTER
Patient recently trying to get both metoprolol and atenolol/cholorthaladone filled at the pharmacy. Should she be on both medications? Left message for patient to return call.

## 2021-01-04 RX ORDER — LEVOTHYROXINE SODIUM 0.05 MG/1
TABLET ORAL
Qty: 90 TABLET | Refills: 1 | Status: SHIPPED | OUTPATIENT
Start: 2021-01-04 | End: 2021-04-26

## 2021-01-04 NOTE — TELEPHONE ENCOUNTER
Kimmy Doherty is requesting a refill on the following medication(s):  Requested Prescriptions     Pending Prescriptions Disp Refills    levothyroxine (SYNTHROID) 50 MCG tablet [Pharmacy Med Name: LEVOTHYROXINE 50 MCG TABLET] 90 tablet 1     Sig: TAKE ONE TABLET BY MOUTH DAILY       Last Visit Date (If Applicable):  1/7/1317    Next Visit Date:    2/11/2021

## 2021-03-18 ENCOUNTER — OFFICE VISIT (OUTPATIENT)
Dept: FAMILY MEDICINE CLINIC | Age: 66
End: 2021-03-18
Payer: COMMERCIAL

## 2021-03-18 VITALS
DIASTOLIC BLOOD PRESSURE: 82 MMHG | BODY MASS INDEX: 29.64 KG/M2 | SYSTOLIC BLOOD PRESSURE: 130 MMHG | OXYGEN SATURATION: 97 % | HEART RATE: 88 BPM | WEIGHT: 170 LBS

## 2021-03-18 DIAGNOSIS — Z23 NEED FOR VACCINATION FOR MENINGOCOCCUS: ICD-10-CM

## 2021-03-18 DIAGNOSIS — Z12.31 VISIT FOR SCREENING MAMMOGRAM: ICD-10-CM

## 2021-03-18 DIAGNOSIS — R73.01 IMPAIRED FASTING GLUCOSE: ICD-10-CM

## 2021-03-18 DIAGNOSIS — Z90.81 HISTORY OF PARTIAL SPLENECTOMY: ICD-10-CM

## 2021-03-18 DIAGNOSIS — E03.9 ACQUIRED HYPOTHYROIDISM: ICD-10-CM

## 2021-03-18 DIAGNOSIS — I10 ESSENTIAL HYPERTENSION: Primary | ICD-10-CM

## 2021-03-18 DIAGNOSIS — E78.2 MIXED HYPERLIPIDEMIA: ICD-10-CM

## 2021-03-18 DIAGNOSIS — Z90.81 HISTORY OF SPLENECTOMY: ICD-10-CM

## 2021-03-18 LAB — HBA1C MFR BLD: 6.2 %

## 2021-03-18 PROCEDURE — 83036 HEMOGLOBIN GLYCOSYLATED A1C: CPT | Performed by: FAMILY MEDICINE

## 2021-03-18 PROCEDURE — 99214 OFFICE O/P EST MOD 30 MIN: CPT | Performed by: FAMILY MEDICINE

## 2021-03-18 RX ORDER — METOPROLOL SUCCINATE 50 MG/1
TABLET, EXTENDED RELEASE ORAL
Qty: 90 TABLET | Refills: 1 | Status: SHIPPED | OUTPATIENT
Start: 2021-03-18 | End: 2021-07-28

## 2021-03-18 ASSESSMENT — PATIENT HEALTH QUESTIONNAIRE - PHQ9
SUM OF ALL RESPONSES TO PHQ QUESTIONS 1-9: 0

## 2021-03-18 ASSESSMENT — ENCOUNTER SYMPTOMS
SHORTNESS OF BREATH: 0
ORTHOPNEA: 0
BLURRED VISION: 0

## 2021-03-18 NOTE — PATIENT INSTRUCTIONS
Check into the meningitis B and the Menactra vaccines to see if these are covered as prevention due to the history of splenectomy by your insurance.  (ICD z23 and Z 90.81)

## 2021-03-18 NOTE — PROGRESS NOTES
Mother     Osteoporosis Mother     Other Mother         blood disorder    Heart Attack Father     Other Sister         back pain    Kidney Disease Brother     Cancer Brother         lung    Rheum Arthritis Daughter        Social History     Tobacco Use    Smoking status: Never Smoker    Smokeless tobacco: Never Used   Substance Use Topics    Alcohol use: No      Current Outpatient Medications   Medication Sig Dispense Refill    metoprolol succinate (TOPROL XL) 50 MG extended release tablet TAKE ONE TABLET BY MOUTH DAILY 90 tablet 1    levothyroxine (SYNTHROID) 50 MCG tablet TAKE ONE TABLET BY MOUTH DAILY 90 tablet 1    rosuvastatin (CRESTOR) 20 MG tablet Take 0.5 tablets by mouth nightly 45 tablet 3    spironolactone (ALDACTONE) 25 MG tablet Take 1 tablet by mouth daily 90 tablet 3    latanoprost (XALATAN) 0.005 % ophthalmic solution 1 drop nightly       No current facility-administered medications for this visit.       Allergies   Allergen Reactions    Atorvastatin      Elevated LFT's    Pneumococcal Vaccines      Arm swelling , red       Health Maintenance   Topic Date Due    Hepatitis C screen  Never done    Meningococcal (ACWY) vaccine (1 - Risk start before 7 months 4-dose series) Never done    Hib vaccine (1 of 1 - Risk 1-dose series) Never done    Meningococcal B vaccine (1 of 4 - Increased Risk Bexsero 2-dose series) Never done    HIV screen  Never done    COVID-19 Vaccine (1) Never done    Shingles Vaccine (1 of 2) Never done    Colon cancer screen colonoscopy  Never done    Breast cancer screen  02/15/2018    Flu vaccine (1) 09/01/2020    Lipid screen  02/12/2021    Potassium monitoring  02/12/2021    Creatinine monitoring  02/12/2021    A1C test (Diabetic or Prediabetic)  03/18/2022    Cervical cancer screen  11/25/2024    DTaP/Tdap/Td vaccine (3 - Td) 11/25/2029    Hepatitis A vaccine  Aged Out    Hepatitis B vaccine  Aged Out       Subjective:      Review of Systems Constitutional: Negative for malaise/fatigue. Eyes: Negative for blurred vision. Respiratory: Negative for shortness of breath. Cardiovascular: Negative for chest pain, palpitations, orthopnea and PND. Musculoskeletal: Negative for neck pain. Neurological: Negative for headaches. Objective:     /82 (Site: Left Upper Arm, Position: Sitting, Cuff Size: Large Adult)   Pulse 88   Wt 170 lb (77.1 kg)   SpO2 97%   BMI 29.64 kg/m²     Physical Exam  Vitals signs and nursing note reviewed. Constitutional:       Appearance: Normal appearance. She is well-developed. HENT:      Head: Normocephalic and atraumatic. Mouth/Throat:      Mouth: Mucous membranes are moist.   Eyes:      Conjunctiva/sclera: Conjunctivae normal.   Neck:      Musculoskeletal: Normal range of motion and neck supple. Thyroid: No thyromegaly. Vascular: No JVD. Cardiovascular:      Rate and Rhythm: Normal rate and regular rhythm. Pulses: Normal pulses. Heart sounds: Normal heart sounds. No murmur. No friction rub. No gallop. Pulmonary:      Effort: Pulmonary effort is normal. No respiratory distress. Breath sounds: Normal breath sounds. Abdominal:      General: There is no distension. Palpations: Abdomen is soft. There is no mass. Tenderness: There is no abdominal tenderness. There is no guarding. Hernia: No hernia is present. Comments: Well healed surgical scars   Musculoskeletal:      Right lower leg: No edema. Left lower leg: No edema. Lymphadenopathy:      Cervical: No cervical adenopathy. Skin:     General: Skin is warm. Capillary Refill: Capillary refill takes less than 2 seconds. Neurological:      General: No focal deficit present. Mental Status: She is alert and oriented to person, place, and time. Psychiatric:         Mood and Affect: Mood normal.         Behavior: Behavior normal.         Thought Content:  Thought content normal. Judgment: Judgment normal.         Assessment/Plan:     1. Essential hypertension  Assessment & Plan:  Asymptomatic and reasonable controlled at this time. Check labs annually for endorgan damage. Continue to current medications and encourage diet and exercise. Orders:  -     Comprehensive Metabolic Panel; Future  -     metoprolol succinate (TOPROL XL) 50 MG extended release tablet; TAKE ONE TABLET BY MOUTH DAILY, Disp-90 tablet, R-1Normal  2. Mixed hyperlipidemia  Assessment & Plan:  Hyperlipidemia is been well controlled on the 10 mg of the rosuvastatin daily. Continue on this check labs annually. Encourage diet and exercise. Orders:  -     Comprehensive Metabolic Panel; Future  -     Lipid Panel; Future  3. Impaired fasting glucose  Assessment & Plan:  Encourage diet and exercise. Check hemoglobin A1c every 6 to 12 months and the fasting blood sugar annually. Orders:  -     POCT glycosylated hemoglobin (Hb A1C)  4. History of partial splenectomy  5. History of splenectomy  6. Need for vaccination for meningococcus  7. Acquired hypothyroidism  Assessment & Plan:  Asymptomatic well-controlled. Check labs annually for dose monitoring. Orders:  -     T4, Free; Future  -     TSH without Reflex; Future  8. Visit for screening mammogram  -     [facility] DIGITAL SCREEN BILATERAL; Future    Encouraged updating her immunizations with the meningitis vaccines. She will check her insurance coverage at this point to see if these are an option now or wait until she is on Medicare.     Lab Results   Component Value Date    CHOL 319 02/12/2020    TRIG 201 02/12/2020    HDL 56 02/12/2020    ALT 20 02/12/2020    AST 21 02/12/2020     02/12/2020    K 3.3 02/12/2020    CL 99 02/12/2020    CREATININE 0.65 02/12/2020    BUN 23 02/12/2020    CO2 25 02/12/2020    TSH 1.95 08/03/2019    GLUF 123 08/23/2018    LABA1C 6.2 03/18/2021    LABA1C 6.1 02/12/2020    LABA1C 6.3 08/03/2019       Return in about 6 months (around 9/18/2021), or welcome to medicare, for AWV. Patient given educational materials - see patientinstructions. Discussed use, benefit, and side effects of prescribed medications. All patient questions answered. Pt voiced understanding. Reviewed health maintenance. Instructed to continue current medications, diet andexercise. Patient agreed with treatment plan. Follow up as directed.      (Please note that portions of this note were completed with a voice-recognition program. Efforts were made to edit the dictation but occasionally words are mis-transcribed.)    Electronically signed by Vandana Kenney MD on 3/21/2021

## 2021-03-21 PROBLEM — E03.9 ACQUIRED HYPOTHYROIDISM: Status: ACTIVE | Noted: 2021-03-21

## 2021-03-22 NOTE — ASSESSMENT & PLAN NOTE
Asymptomatic and reasonable controlled at this time. Check labs annually for endorgan damage. Continue to current medications and encourage diet and exercise.

## 2021-03-22 NOTE — ASSESSMENT & PLAN NOTE
Encourage diet and exercise. Check hemoglobin A1c every 6 to 12 months and the fasting blood sugar annually.

## 2021-03-22 NOTE — ASSESSMENT & PLAN NOTE
Hyperlipidemia is been well controlled on the 10 mg of the rosuvastatin daily. Continue on this check labs annually. Encourage diet and exercise.

## 2021-04-26 RX ORDER — LEVOTHYROXINE SODIUM 0.05 MG/1
TABLET ORAL
Qty: 90 TABLET | Refills: 3 | Status: SHIPPED | OUTPATIENT
Start: 2021-04-26 | End: 2021-10-04 | Stop reason: SDUPTHER

## 2021-04-26 NOTE — TELEPHONE ENCOUNTER
Bebo Lamar is requesting a refill on the following medication(s):  Requested Prescriptions     Pending Prescriptions Disp Refills    levothyroxine (SYNTHROID) 50 MCG tablet [Pharmacy Med Name: LEVOTHYROXINE 50 MCG TABLET] 90 tablet 0     Sig: TAKE ONE TABLET BY MOUTH DAILY       Last Visit Date (If Applicable):  7/07/3755    Next Visit Date:    10/4/2021

## 2021-07-07 ENCOUNTER — TELEPHONE (OUTPATIENT)
Dept: FAMILY MEDICINE CLINIC | Age: 66
End: 2021-07-07

## 2021-07-24 DIAGNOSIS — I10 ESSENTIAL HYPERTENSION: ICD-10-CM

## 2021-07-26 NOTE — TELEPHONE ENCOUNTER
Iris Seals is requesting a refill on the following medication(s):  Requested Prescriptions     Pending Prescriptions Disp Refills    metoprolol succinate (TOPROL XL) 50 MG extended release tablet [Pharmacy Med Name: METOPROLOL SUCC ER 50 MG TAB] 90 tablet 0     Sig: TAKE ONE TABLET BY MOUTH DAILY       Last Visit Date (If Applicable):  0/28/6808    Next Visit Date:    10/4/2021

## 2021-07-28 RX ORDER — METOPROLOL SUCCINATE 50 MG/1
TABLET, EXTENDED RELEASE ORAL
Qty: 90 TABLET | Refills: 3 | Status: SHIPPED | OUTPATIENT
Start: 2021-07-28 | End: 2021-10-04 | Stop reason: SDUPTHER

## 2021-08-02 DIAGNOSIS — E78.2 MIXED HYPERLIPIDEMIA: ICD-10-CM

## 2021-08-02 DIAGNOSIS — I10 ESSENTIAL HYPERTENSION: ICD-10-CM

## 2021-08-03 NOTE — TELEPHONE ENCOUNTER
Praveen Jon is requesting a refill on the following medication(s):  Requested Prescriptions     Pending Prescriptions Disp Refills    spironolactone (ALDACTONE) 25 MG tablet [Pharmacy Med Name: SPIRONOLACTONE 25 MG TABLET] 90 tablet 2     Sig: TAKE ONE TABLET BY MOUTH DAILY    rosuvastatin (CRESTOR) 20 MG tablet [Pharmacy Med Name: ROSUVASTATIN CALCIUM 20 MG TAB] 45 tablet 2     Sig: TAKE 1/2 TABLET BY MOUTH NIGHTLY       Last Visit Date (If Applicable):  2/58/0578    Next Visit Date:    10/4/2021

## 2021-08-04 RX ORDER — ROSUVASTATIN CALCIUM 20 MG/1
TABLET, COATED ORAL
Qty: 45 TABLET | Refills: 0 | Status: SHIPPED | OUTPATIENT
Start: 2021-08-04 | End: 2021-08-05

## 2021-08-04 RX ORDER — SPIRONOLACTONE 25 MG/1
TABLET ORAL
Qty: 90 TABLET | Refills: 0 | Status: SHIPPED | OUTPATIENT
Start: 2021-08-04 | End: 2021-08-05

## 2021-08-04 NOTE — TELEPHONE ENCOUNTER
Please check with patient. Refills were given but she needs to get her labs done. I believe there is a community screening that is inexpensive through [place] this month.   Please let her know orders are in the computer she needs O's

## 2021-08-05 DIAGNOSIS — I10 ESSENTIAL HYPERTENSION: ICD-10-CM

## 2021-08-05 DIAGNOSIS — E78.2 MIXED HYPERLIPIDEMIA: ICD-10-CM

## 2021-08-05 RX ORDER — ROSUVASTATIN CALCIUM 20 MG/1
TABLET, COATED ORAL
Qty: 45 TABLET | Refills: 2 | Status: SHIPPED | OUTPATIENT
Start: 2021-08-05 | End: 2021-10-04 | Stop reason: SDUPTHER

## 2021-08-05 RX ORDER — SPIRONOLACTONE 25 MG/1
TABLET ORAL
Qty: 90 TABLET | Refills: 2 | Status: SHIPPED | OUTPATIENT
Start: 2021-08-05 | End: 2021-10-04 | Stop reason: SDUPTHER

## 2021-08-05 NOTE — TELEPHONE ENCOUNTER
José Manuel Kan is requesting a refill on the following medication(s):  Requested Prescriptions     Pending Prescriptions Disp Refills    spironolactone (ALDACTONE) 25 MG tablet [Pharmacy Med Name: SPIRONOLACTONE 25 MG TABLET] 90 tablet 2     Sig: TAKE ONE TABLET BY MOUTH DAILY    rosuvastatin (CRESTOR) 20 MG tablet [Pharmacy Med Name: ROSUVASTATIN CALCIUM 20 MG TAB] 45 tablet 2     Sig: TAKE 1/2 TABLET BY MOUTH NIGHTLY       Last Visit Date (If Applicable):  9/32/1474    Next Visit Date:    10/4/2021

## 2021-09-28 LAB
ALBUMIN/GLOBULIN RATIO: 1.7 G/DL
ALBUMIN: 5 G/DL (ref 3.5–5)
ALP BLD-CCNC: 88 UNITS/L (ref 38–126)
ALT SERPL-CCNC: 22 UNITS/L (ref 4–35)
ANION GAP SERPL CALCULATED.3IONS-SCNC: 11 MMOL/L
AST SERPL-CCNC: 30 UNITS/L (ref 14–36)
BILIRUB SERPL-MCNC: 0.7 MG/DL (ref 0.2–1.3)
BUN BLDV-MCNC: 15 MG/DL (ref 7–17)
CALCIUM SERPL-MCNC: 10.4 MG/DL (ref 8.4–10.2)
CHLORIDE BLD-SCNC: 102 MMOL/L (ref 98–120)
CHOLESTEROL/HDL RATIO: 3.2 RATIO (ref 0–4.5)
CHOLESTEROL: 192 MG/DL (ref 50–200)
CO2: 26 MMOL/L (ref 22–31)
CREAT SERPL-MCNC: 0.7 MG/DL (ref 0.5–1)
GFR CALCULATED: > 60
GLOBULIN: 3 G/DL
GLUCOSE: 104 MG/DL (ref 65–105)
HDLC SERPL-MCNC: 60 MG/DL (ref 36–68)
LDL CHOLESTEROL CALCULATED: 105.6 MG/DL (ref 0–160)
POTASSIUM SERPL-SCNC: 4.5 MMOL/L (ref 3.6–5)
SODIUM BLD-SCNC: 139 MMOL/L (ref 135–145)
T4 FREE: 1.01 NG/DL (ref 0.78–2.19)
TOTAL PROTEIN, SERUM: 8 G/DL (ref 6.3–8.2)
TRIGL SERPL-MCNC: 132 MG/DL (ref 10–250)
TSH SERPL DL<=0.05 MIU/L-ACNC: 1.94 MIU/ML (ref 0.49–4.67)
VLDLC SERPL CALC-MCNC: 26 MG/DL (ref 0–50)

## 2021-09-29 ASSESSMENT — PATIENT HEALTH QUESTIONNAIRE - PHQ9
SUM OF ALL RESPONSES TO PHQ QUESTIONS 1-9: 0
SUM OF ALL RESPONSES TO PHQ QUESTIONS 1-9: 0
2. FEELING DOWN, DEPRESSED OR HOPELESS: 0
SUM OF ALL RESPONSES TO PHQ9 QUESTIONS 1 & 2: 0
1. LITTLE INTEREST OR PLEASURE IN DOING THINGS: 0
SUM OF ALL RESPONSES TO PHQ QUESTIONS 1-9: 0

## 2021-09-29 ASSESSMENT — LIFESTYLE VARIABLES
HOW OFTEN DURING THE LAST YEAR HAVE YOU HAD A FEELING OF GUILT OR REMORSE AFTER DRINKING: 0
HAVE YOU OR SOMEONE ELSE BEEN INJURED AS A RESULT OF YOUR DRINKING: NO
HOW OFTEN DURING THE LAST YEAR HAVE YOU HAD A FEELING OF GUILT OR REMORSE AFTER DRINKING: NEVER
AUDIT-C TOTAL SCORE: 0
HOW OFTEN DO YOU HAVE A DRINK CONTAINING ALCOHOL: 1
HOW OFTEN DURING THE LAST YEAR HAVE YOU BEEN UNABLE TO REMEMBER WHAT HAPPENED THE NIGHT BEFORE BECAUSE YOU HAD BEEN DRINKING: NEVER
HOW OFTEN DURING THE LAST YEAR HAVE YOU FOUND THAT YOU WERE NOT ABLE TO STOP DRINKING ONCE YOU HAD STARTED: 0
HAS A RELATIVE, FRIEND, DOCTOR, OR ANOTHER HEALTH PROFESSIONAL EXPRESSED CONCERN ABOUT YOUR DRINKING OR SUGGESTED YOU CUT DOWN: NO
HOW OFTEN DURING THE LAST YEAR HAVE YOU BEEN UNABLE TO REMEMBER WHAT HAPPENED THE NIGHT BEFORE BECAUSE YOU HAD BEEN DRINKING: 0
HAVE YOU OR SOMEONE ELSE BEEN INJURED AS A RESULT OF YOUR DRINKING: 0
HOW OFTEN DURING THE LAST YEAR HAVE YOU FAILED TO DO WHAT WAS NORMALLY EXPECTED FROM YOU BECAUSE OF DRINKING: NEVER
HOW OFTEN DO YOU HAVE SIX OR MORE DRINKS ON ONE OCCASION: 0
HOW MANY STANDARD DRINKS CONTAINING ALCOHOL DO YOU HAVE ON A TYPICAL DAY: 0
HAS A RELATIVE, FRIEND, DOCTOR, OR ANOTHER HEALTH PROFESSIONAL EXPRESSED CONCERN ABOUT YOUR DRINKING OR SUGGESTED YOU CUT DOWN: 0
HOW OFTEN DO YOU HAVE A DRINK CONTAINING ALCOHOL: MONTHLY OR LESS
HOW OFTEN DURING THE LAST YEAR HAVE YOU FAILED TO DO WHAT WAS NORMALLY EXPECTED FROM YOU BECAUSE OF DRINKING: 0
HOW OFTEN DURING THE LAST YEAR HAVE YOU NEEDED AN ALCOHOLIC DRINK FIRST THING IN THE MORNING TO GET YOURSELF GOING AFTER A NIGHT OF HEAVY DRINKING: 0
HOW MANY STANDARD DRINKS CONTAINING ALCOHOL DO YOU HAVE ON A TYPICAL DAY: ONE OR TWO
AUDIT TOTAL SCORE: 0
HOW OFTEN DURING THE LAST YEAR HAVE YOU FOUND THAT YOU WERE NOT ABLE TO STOP DRINKING ONCE YOU HAD STARTED: NEVER
HOW OFTEN DURING THE LAST YEAR HAVE YOU NEEDED AN ALCOHOLIC DRINK FIRST THING IN THE MORNING TO GET YOURSELF GOING AFTER A NIGHT OF HEAVY DRINKING: NEVER
HOW OFTEN DO YOU HAVE SIX OR MORE DRINKS ON ONE OCCASION: NEVER
AUDIT TOTAL SCORE: 1
AUDIT-C TOTAL SCORE: 1

## 2021-10-04 ENCOUNTER — OFFICE VISIT (OUTPATIENT)
Dept: FAMILY MEDICINE CLINIC | Age: 66
End: 2021-10-04
Payer: MEDICARE

## 2021-10-04 VITALS
WEIGHT: 167 LBS | OXYGEN SATURATION: 99 % | HEART RATE: 66 BPM | SYSTOLIC BLOOD PRESSURE: 138 MMHG | BODY MASS INDEX: 28.51 KG/M2 | DIASTOLIC BLOOD PRESSURE: 66 MMHG | HEIGHT: 64 IN

## 2021-10-04 DIAGNOSIS — Z90.81 HISTORY OF SPLENECTOMY: ICD-10-CM

## 2021-10-04 DIAGNOSIS — Z11.59 ENCOUNTER FOR HEPATITIS C SCREENING TEST FOR LOW RISK PATIENT: ICD-10-CM

## 2021-10-04 DIAGNOSIS — Z78.0 POST-MENOPAUSAL: ICD-10-CM

## 2021-10-04 DIAGNOSIS — E83.52 HYPERCALCEMIA: ICD-10-CM

## 2021-10-04 DIAGNOSIS — Z23 NEED FOR PROPHYLACTIC VACCINATION AND INOCULATION AGAINST MENINGOCOCCUS: ICD-10-CM

## 2021-10-04 DIAGNOSIS — Z00.00 ROUTINE GENERAL MEDICAL EXAMINATION AT A HEALTH CARE FACILITY: Primary | ICD-10-CM

## 2021-10-04 DIAGNOSIS — Z11.4 SCREENING FOR HIV WITHOUT PRESENCE OF RISK FACTORS: ICD-10-CM

## 2021-10-04 DIAGNOSIS — E03.9 ACQUIRED HYPOTHYROIDISM: ICD-10-CM

## 2021-10-04 DIAGNOSIS — Z23 NEED FOR INFLUENZA VACCINATION: ICD-10-CM

## 2021-10-04 DIAGNOSIS — Z72.89 OTHER PROBLEMS RELATED TO LIFESTYLE: ICD-10-CM

## 2021-10-04 DIAGNOSIS — Z12.11 SCREENING FOR COLON CANCER: ICD-10-CM

## 2021-10-04 DIAGNOSIS — E78.2 MIXED HYPERLIPIDEMIA: ICD-10-CM

## 2021-10-04 DIAGNOSIS — I10 ESSENTIAL HYPERTENSION: ICD-10-CM

## 2021-10-04 PROCEDURE — G8484 FLU IMMUNIZE NO ADMIN: HCPCS | Performed by: FAMILY MEDICINE

## 2021-10-04 PROCEDURE — G8427 DOCREV CUR MEDS BY ELIG CLIN: HCPCS | Performed by: FAMILY MEDICINE

## 2021-10-04 PROCEDURE — 1090F PRES/ABSN URINE INCON ASSESS: CPT | Performed by: FAMILY MEDICINE

## 2021-10-04 PROCEDURE — 4040F PNEUMOC VAC/ADMIN/RCVD: CPT | Performed by: FAMILY MEDICINE

## 2021-10-04 PROCEDURE — G0402 INITIAL PREVENTIVE EXAM: HCPCS | Performed by: FAMILY MEDICINE

## 2021-10-04 PROCEDURE — G0463 HOSPITAL OUTPT CLINIC VISIT: HCPCS | Performed by: FAMILY MEDICINE

## 2021-10-04 PROCEDURE — 1036F TOBACCO NON-USER: CPT | Performed by: FAMILY MEDICINE

## 2021-10-04 PROCEDURE — G8417 CALC BMI ABV UP PARAM F/U: HCPCS | Performed by: FAMILY MEDICINE

## 2021-10-04 PROCEDURE — 3017F COLORECTAL CA SCREEN DOC REV: CPT | Performed by: FAMILY MEDICINE

## 2021-10-04 PROCEDURE — 99214 OFFICE O/P EST MOD 30 MIN: CPT | Performed by: FAMILY MEDICINE

## 2021-10-04 PROCEDURE — PBSHW INFLUENZA, QUADV, ADJUVANTED, 65 YRS +, IM, PF, PREFILL SYR, 0.5ML (FLUAD): Performed by: FAMILY MEDICINE

## 2021-10-04 PROCEDURE — 90694 VACC AIIV4 NO PRSRV 0.5ML IM: CPT | Performed by: FAMILY MEDICINE

## 2021-10-04 PROCEDURE — 99397 PER PM REEVAL EST PAT 65+ YR: CPT | Performed by: FAMILY MEDICINE

## 2021-10-04 PROCEDURE — 1123F ACP DISCUSS/DSCN MKR DOCD: CPT | Performed by: FAMILY MEDICINE

## 2021-10-04 PROCEDURE — G8400 PT W/DXA NO RESULTS DOC: HCPCS | Performed by: FAMILY MEDICINE

## 2021-10-04 RX ORDER — LEVOTHYROXINE SODIUM 0.05 MG/1
TABLET ORAL
Qty: 90 TABLET | Refills: 3 | Status: SHIPPED | OUTPATIENT
Start: 2021-10-04 | End: 2022-10-28 | Stop reason: SDUPTHER

## 2021-10-04 RX ORDER — ROSUVASTATIN CALCIUM 10 MG/1
TABLET, COATED ORAL
Qty: 90 TABLET | Refills: 3 | Status: SHIPPED | OUTPATIENT
Start: 2021-10-04 | End: 2022-10-28 | Stop reason: SDUPTHER

## 2021-10-04 RX ORDER — SPIRONOLACTONE 25 MG/1
TABLET ORAL
Qty: 90 TABLET | Refills: 3 | Status: SHIPPED | OUTPATIENT
Start: 2021-10-04 | End: 2022-10-28 | Stop reason: SDUPTHER

## 2021-10-04 RX ORDER — METOPROLOL SUCCINATE 50 MG/1
50 TABLET, EXTENDED RELEASE ORAL DAILY
Qty: 90 TABLET | Refills: 3 | Status: SHIPPED | OUTPATIENT
Start: 2021-10-04 | End: 2022-10-28 | Stop reason: SDUPTHER

## 2021-10-04 SDOH — ECONOMIC STABILITY: FOOD INSECURITY: WITHIN THE PAST 12 MONTHS, THE FOOD YOU BOUGHT JUST DIDN'T LAST AND YOU DIDN'T HAVE MONEY TO GET MORE.: NEVER TRUE

## 2021-10-04 SDOH — ECONOMIC STABILITY: FOOD INSECURITY: WITHIN THE PAST 12 MONTHS, YOU WORRIED THAT YOUR FOOD WOULD RUN OUT BEFORE YOU GOT MONEY TO BUY MORE.: NEVER TRUE

## 2021-10-04 SDOH — ECONOMIC STABILITY: TRANSPORTATION INSECURITY
IN THE PAST 12 MONTHS, HAS THE LACK OF TRANSPORTATION KEPT YOU FROM MEDICAL APPOINTMENTS OR FROM GETTING MEDICATIONS?: NO

## 2021-10-04 SDOH — ECONOMIC STABILITY: TRANSPORTATION INSECURITY
IN THE PAST 12 MONTHS, HAS LACK OF TRANSPORTATION KEPT YOU FROM MEETINGS, WORK, OR FROM GETTING THINGS NEEDED FOR DAILY LIVING?: NO

## 2021-10-04 ASSESSMENT — SOCIAL DETERMINANTS OF HEALTH (SDOH): HOW HARD IS IT FOR YOU TO PAY FOR THE VERY BASICS LIKE FOOD, HOUSING, MEDICAL CARE, AND HEATING?: NOT HARD AT ALL

## 2021-10-04 NOTE — PROGRESS NOTES
Have you had an allergic reaction to the flu (influenza) shot? no  Are you allergic to eggs or any component of the flu vaccine? no  Do you have a history of Guillain-[place] Syndrome (GBS), which is paralysis after receiving the flu vaccine? no  Are you feeling well today? yes  Flu vaccine given as ordered. Patient tolerated it well. No questions re: VIS information. Medicare Annual Wellness Visit  Name: Rosebud Schlatter Date: 10/10/2021   MRN: [id number] Sex: Female   Age: 73 y.o. Ethnicity: Non- / Non    : 1955 Race: White (non-)      Ivette Rendon is here for Medicare AWV (welcome to [facility]) and Hypertension (follow up)    Screenings for behavioral, psychosocial and functional/safety risks, and cognitive dysfunction are all negative except as indicated below. These results, as well as other patient data from the [address] form, are documented in Flowsheets linked to this Encounter. HTN: Taking her meds and tolerating well. No concerns. No CP/ no dizziness/ no SOB/ no leg edema. No palpitations. No sx from her her hyper lipidemia and taking her medications regularly. labs reviewed at this time-- labs reviewed and well controlled at this time. No s/sx of her thyroid disease- no fatigue, weight change- only intentional.  No change in the bowels or the bladder noted. Allergies   Allergen Reactions    Atorvastatin      Elevated LFT's    Pneumococcal Vaccines      Arm swelling , red         Prior to Visit Medications    Medication Sig Taking?  Authorizing Provider   spironolactone (ALDACTONE) 25 MG tablet TAKE ONE TABLET BY MOUTH DAILY Yes Staci Mcgill MD   rosuvastatin (CRESTOR) 10 MG tablet TAKE ONE TABLET BY MOUTH NIGHTLY Yes Staci Mcgill MD   metoprolol succinate (TOPROL XL) 50 MG extended release tablet Take 1 tablet by mouth daily Yes Staci Mcgill MD   levothyroxine (SYNTHROID) 50 MCG tablet TAKE ONE TABLET BY MOUTH DAILY Yes Wali Squires MD [facility]   latanoprost (XALATAN) 0.005 % ophthalmic solution 1 drop nightly Yes Historical Provider, MD         Past Medical History:   Diagnosis Date    Hyperlipidemia     Hypertension     Hyperthyroidism     IFG (impaired fasting glucose)     Migraine     unspecified not intractable with out status migrainosus     Mixed hyperlipidemia 10/4/2018       Past Surgical History:   Procedure Laterality Date    CHOLECYSTECTOMY  1980    OVARIAN CYST DRAINAGE      SPLENECTOMY  2001         Family History   Problem Relation Age of Onset    Heart Disease Mother     Osteoporosis Mother     Other Mother         blood disorder    Heart Attack Father     Other Sister         back pain    Kidney Disease Brother     Cancer Brother         lung    Rheum Arthritis Daughter        CareTeam (Including outside providers/suppliers regularly involved in providing care):   Patient Care Team:  Tiffany Allan MD as PCP - General (Family Medicine)  Tiffany Allan MD as PCP - [facility] Empaneled Provider  Anushka Parks MD (Ophthalmology)    Wt Readings from Last 3 Encounters:   10/04/21 167 lb (75.8 kg)   03/18/21 170 lb (77.1 kg)   08/07/20 174 lb (78.9 kg)     Vitals:    10/04/21 1045   BP: 138/66   Site: Left Upper Arm   Position: Sitting   Cuff Size: Large Adult   Pulse: 66   SpO2: 99%   Weight: 167 lb (75.8 kg)   Height: 5' 3.5\" (1.613 m)     Body mass index is 29.12 kg/m². Based upon direct observation of the patient, evaluation of cognition reveals recent and remote memory intact. Physical Exam  Vitals and nursing note reviewed. Constitutional:       Appearance: Normal appearance. She is well-developed. HENT:      Head: Normocephalic and atraumatic. Mouth/Throat:      Mouth: Mucous membranes are moist.   Eyes:      Conjunctiva/sclera: Conjunctivae normal.   Neck:      Thyroid: No thyromegaly. Vascular: No JVD. Cardiovascular:      Rate and Rhythm: Normal rate and regular rhythm.       Pulses: Normal pulses. Heart sounds: Normal heart sounds. No murmur heard. No friction rub. No gallop. Pulmonary:      Effort: Pulmonary effort is normal. No respiratory distress. Breath sounds: Normal breath sounds. Abdominal:      General: There is no distension. Palpations: Abdomen is soft. There is no mass. Tenderness: There is no abdominal tenderness. There is no guarding. Hernia: No hernia is present. Comments: Well healed surgical scars   Musculoskeletal:      Cervical back: Normal range of motion and neck supple. Right lower leg: No edema. Left lower leg: No edema. Lymphadenopathy:      Cervical: No cervical adenopathy. Skin:     General: Skin is warm. Capillary Refill: Capillary refill takes less than 2 seconds. Neurological:      General: No focal deficit present. Mental Status: She is alert and oriented to person, place, and time. Psychiatric:         Mood and Affect: Mood normal.         Behavior: Behavior normal.         Thought Content: Thought content normal.         Judgment: Judgment normal.         1. Routine general medical examination at a health care facility  -     DNR comfort care - arrest  2. Essential hypertension  -     spironolactone (ALDACTONE) 25 MG tablet; TAKE ONE TABLET BY MOUTH DAILY, Disp-90 tablet, R-3Normal  -     metoprolol succinate (TOPROL XL) 50 MG extended release tablet; Take 1 tablet by mouth daily, Disp-90 tablet, R-3Normal  3. Mixed hyperlipidemia  -     rosuvastatin (CRESTOR) 10 MG tablet; TAKE ONE TABLET BY MOUTH NIGHTLY, Disp-90 tablet, R-3Normal  4. Need for influenza vaccination  -     INFLUENZA, QUADV, ADJUVANTED, 65 YRS =, IM, PF, PREFILL SYR, 0.5ML (FLUAD)  5. Hypercalcemia  -     Calcium, Ionized; Future  6. Encounter for hepatitis C screening test for low risk patient  -     Hepatitis C Antibody; Future  7. Other problems related to lifestyle  8.  Screening for HIV without presence of risk factors  - HIV Screen; Future  9. Screening for colon cancer  -     Cologuard (For External Results Only); Future  10. Post-menopausal  -     DEXA Bone Density Axial Skeleton; Future  11. Need for prophylactic vaccination and inoculation against meningococcus  -     Meningococcal B Vac, Recomb, FLOR; Inject 0.5 mLs into the muscle once for 1 dose, Disp-0.5 mL, R-1Normal  -     meningococcal polysaccharide (MENACTRA) injection; Inject 0.5 mLs into the muscle once for 1 dose, Disp-0.5 mL, R-0Normal  12. History of splenectomy  -     Meningococcal B Vac, Recomb, FLOR; Inject 0.5 mLs into the muscle once for 1 dose, Disp-0.5 mL, R-1Normal  -     meningococcal polysaccharide (MENACTRA) injection; Inject 0.5 mLs into the muscle once for 1 dose, Disp-0.5 mL, R-0Normal  13. Acquired hypothyroidism    HTn well controlled at this time as is the hyperlipidemia. Labs show well controlled and the abnormal liver enzymes also well controlled continue with the current meds as well as with the diet and the exercise. Recheck the calcium level in the near future- ionized and avoid extra po calcium supplementation. Patient's complete Health Risk Assessment and screening values have been reviewed and are found in Flowsheets. The following problems were reviewed today and where indicated follow up appointments were made and/or referrals ordered. Positive Risk Factor Screenings with Interventions:            General Health and ACP:  General  In general, how would you say your health is?: Very Good  In the past 7 days, have you experienced any of the following?  New or Increased Pain, New or Increased Fatigue, Loneliness, Social Isolation, Stress or Anger?: None of These  Do you get the social and emotional support that you need?: Yes  Do you have a Living Will?: (!) No  Advance Directives     Power of [address] [place] [address] Will ACP-Advance Directive ACP-Power of     Not on File Not on File Not on File Not on [address] [facility] Interventions:  · No Living Will: Advance Care Planning addressed with patient today--     Health Habits/Nutrition:  Health Habits/Nutrition  Do you exercise for at least 20 minutes 2-3 times per week?: Yes  Have you lost any weight without trying in the past 3 months?: No  Do you eat only one meal per day?: No  Have you seen the dentist within the past year?: (!) No  Body mass index: (!) 29.12  Health Habits/Nutrition Interventions:  · Dental exam overdue:  patient encouraged to make appointment with his/her dentist       Personalized Preventive Plan   Current Health Maintenance Status  Immunization History   Administered Date(s) Administered    COVID-19, Jamie Read PF, 30mcg/0.3mL 12/29/2020, 01/19/2021    DTaP 09/25/2009    Influenza Vaccine, unspecified formulation 04/15/2010, 10/22/2018    Influenza Virus Vaccine 10/22/2018    Influenza Whole 10/25/2007    Influenza, Quadv, IM, (6 mo and older Fluzone, Flulaval, Fluarix and 3 yrs and older Afluria) 11/18/2019    Influenza, Quadv, adjuvanted, 65 yrs +, IM, PF (Fluad) 10/04/2021    Influenza, Triv, 3 Years and older, IM (Afluria (5 yrs and older) 11/18/2019    Tdap (Boostrix, Adacel) 11/25/2019        Health Maintenance   Topic Date Due    Hepatitis C screen  Never done    Meningococcal (ACWY) vaccine (1 - Risk start before 7 months 4-dose series) Never done    Hib vaccine (1 of 1 - Risk 1-dose series) Never done    Meningococcal B vaccine (1 of 4 - Increased Risk Bexsero 2-dose series) Never done    HIV screen  Never done    Colon cancer screen colonoscopy  Never done    Shingles Vaccine (1 of 2) Never done    DEXA (modify frequency per FRAX score)  Never done   [employer] Visit (AWV)  Never done    A1C test (Diabetic or Prediabetic)  03/18/2022    Lipid screen  09/28/2022    TSH testing  09/28/2022    Potassium monitoring  09/28/2022    Creatinine monitoring  09/28/2022    Breast cancer screen  09/27/2023    Cervical cancer screen 11/25/2024    DTaP/Tdap/Td vaccine (3 - Td or Tdap) 11/25/2029    Flu vaccine  Completed    COVID-19 Vaccine  Completed    Hepatitis A vaccine  Aged Out    Hepatitis B vaccine  Aged Out     Recommendations for [employer] Due: see orders and patient instructions/AVS.  . Recommended screening schedule for the next 5-10 years is provided to the patient in written form: see Patient Instructions/AVS.    Brian LEIGH LPN, 90/4/3272, performed the documented evaluation under the direct supervision of the attending physician.

## 2021-10-04 NOTE — PATIENT INSTRUCTIONS
Personalized Preventive Plan for [facility] - 10/4/2021  Medicare offers a range of preventive health benefits. Some of the tests and screenings are paid in full while other may be subject to a deductible, co-insurance, and/or copay. Some of these benefits include a comprehensive review of your medical history including lifestyle, illnesses that may run in your family, and various assessments and screenings as appropriate. After reviewing your medical record and screening and assessments performed today your provider may have ordered immunizations, labs, imaging, and/or referrals for you. A list of these orders (if applicable) as well as your Preventive Care list are included within your After Visit Summary for your review. Other Preventive Recommendations:    · A preventive eye exam performed by an eye specialist is recommended every 1-2 years to screen for glaucoma; cataracts, macular degeneration, and other eye disorders. · A preventive dental visit is recommended every 6 months. · Try to get at least 150 minutes of exercise per week or 10,000 steps per day on a pedometer . · Order or download the FREE \"Exercise & Physical Activity: Your Everyday Guide\" from [employer]. Call 5-131.230.9844 or search [employer] online. · You need 6881-4100 mg of calcium and 0137-7280 IU of vitamin D per day. It is possible to meet your calcium requirement with diet alone, but a vitamin D supplement is usually necessary to meet this goal.  · When exposed to the sun, use a sunscreen that protects against both UVA and UVB radiation with an SPF of 30 or greater. Reapply every 2 to 3 hours or after sweating, drying off with a towel, or swimming. · Always wear a seat belt when traveling in a car. Always wear a helmet when riding a bicycle or motorcycle. Personalized Preventive Plan for [facility] - 10/4/2021  Medicare offers a range of preventive health benefits.  Some of the tests and screenings are paid in full while other may be subject to a deductible, co-insurance, and/or copay. Some of these benefits include a comprehensive review of your medical history including lifestyle, illnesses that may run in your family, and various assessments and screenings as appropriate. After reviewing your medical record and screening and assessments performed today your provider may have ordered immunizations, labs, imaging, and/or referrals for you. A list of these orders (if applicable) as well as your Preventive Care list are included within your After Visit Summary for your review. Other Preventive Recommendations:    A preventive eye exam performed by an eye specialist is recommended every 1-2 years to screen for glaucoma; cataracts, macular degeneration, and other eye disorders. A preventive dental visit is recommended every 6 months. Try to get at least 150 minutes of exercise per week or 10,000 steps per day on a pedometer . Order or download the FREE \"Exercise & Physical Activity: Your Everyday Guide\" from [employer]. Call 5-623.820.5312 or search [employer] online. You need 5431-6634 mg of calcium and 8212-3897 IU of vitamin D per day. It is possible to meet your calcium requirement with diet alone, but a vitamin D supplement is usually necessary to meet this goal.  When exposed to the sun, use a sunscreen that protects against both UVA and UVB radiation with an SPF of 30 or greater. Reapply every 2 to 3 hours or after sweating, drying off with a towel, or swimming. Always wear a seat belt when traveling in a car. Always wear a helmet when riding a bicycle or motorcycle.

## 2021-10-10 PROBLEM — R94.5 ABNORMAL RESULTS OF LIVER FUNCTION STUDIES: Status: ACTIVE | Noted: 2018-10-04

## 2021-10-13 DIAGNOSIS — Z78.0 POST-MENOPAUSAL: ICD-10-CM

## 2021-10-21 DIAGNOSIS — Z12.11 SCREENING FOR COLON CANCER: ICD-10-CM

## 2022-04-15 LAB
CALCIUM IONIZED: 1.35 MMOL/L (ref 1.13–1.33)
HEPATITIS C ANTIBODY: NONREACTIVE
HIV AG/AB: NONREACTIVE

## 2022-04-25 ENCOUNTER — OFFICE VISIT (OUTPATIENT)
Dept: FAMILY MEDICINE CLINIC | Age: 67
End: 2022-04-25
Payer: MEDICARE

## 2022-04-25 VITALS
WEIGHT: 171 LBS | OXYGEN SATURATION: 96 % | DIASTOLIC BLOOD PRESSURE: 74 MMHG | SYSTOLIC BLOOD PRESSURE: 134 MMHG | HEART RATE: 73 BPM | BODY MASS INDEX: 29.82 KG/M2

## 2022-04-25 DIAGNOSIS — E78.2 MIXED HYPERLIPIDEMIA: ICD-10-CM

## 2022-04-25 DIAGNOSIS — R73.01 IMPAIRED FASTING GLUCOSE: Primary | ICD-10-CM

## 2022-04-25 DIAGNOSIS — R94.5 ABNORMAL RESULTS OF LIVER FUNCTION STUDIES: ICD-10-CM

## 2022-04-25 DIAGNOSIS — I10 ESSENTIAL HYPERTENSION: ICD-10-CM

## 2022-04-25 DIAGNOSIS — E03.9 ACQUIRED HYPOTHYROIDISM: ICD-10-CM

## 2022-04-25 DIAGNOSIS — E83.52 HYPERCALCEMIA: ICD-10-CM

## 2022-04-25 LAB — HBA1C MFR BLD: 5.8 %

## 2022-04-25 PROCEDURE — G8427 DOCREV CUR MEDS BY ELIG CLIN: HCPCS | Performed by: FAMILY MEDICINE

## 2022-04-25 PROCEDURE — 3017F COLORECTAL CA SCREEN DOC REV: CPT | Performed by: FAMILY MEDICINE

## 2022-04-25 PROCEDURE — 4040F PNEUMOC VAC/ADMIN/RCVD: CPT | Performed by: FAMILY MEDICINE

## 2022-04-25 PROCEDURE — 1036F TOBACCO NON-USER: CPT | Performed by: FAMILY MEDICINE

## 2022-04-25 PROCEDURE — 1090F PRES/ABSN URINE INCON ASSESS: CPT | Performed by: FAMILY MEDICINE

## 2022-04-25 PROCEDURE — 99213 OFFICE O/P EST LOW 20 MIN: CPT | Performed by: FAMILY MEDICINE

## 2022-04-25 PROCEDURE — G8417 CALC BMI ABV UP PARAM F/U: HCPCS | Performed by: FAMILY MEDICINE

## 2022-04-25 PROCEDURE — 83036 HEMOGLOBIN GLYCOSYLATED A1C: CPT | Performed by: FAMILY MEDICINE

## 2022-04-25 PROCEDURE — 1123F ACP DISCUSS/DSCN MKR DOCD: CPT | Performed by: FAMILY MEDICINE

## 2022-04-25 PROCEDURE — G8399 PT W/DXA RESULTS DOCUMENT: HCPCS | Performed by: FAMILY MEDICINE

## 2022-04-25 PROCEDURE — 99214 OFFICE O/P EST MOD 30 MIN: CPT | Performed by: FAMILY MEDICINE

## 2022-04-25 PROCEDURE — PBSHW POCT GLYCOSYLATED HEMOGLOBIN (HGB A1C): Performed by: FAMILY MEDICINE

## 2022-04-25 ASSESSMENT — PATIENT HEALTH QUESTIONNAIRE - PHQ9
SUM OF ALL RESPONSES TO PHQ QUESTIONS 1-9: 0
1. LITTLE INTEREST OR PLEASURE IN DOING THINGS: 0
2. FEELING DOWN, DEPRESSED OR HOPELESS: 0
SUM OF ALL RESPONSES TO PHQ QUESTIONS 1-9: 0
SUM OF ALL RESPONSES TO PHQ9 QUESTIONS 1 & 2: 0
SUM OF ALL RESPONSES TO PHQ QUESTIONS 1-9: 0
SUM OF ALL RESPONSES TO PHQ QUESTIONS 1-9: 0

## 2022-04-25 NOTE — PROGRESS NOTES
[facility]  [address]  Dept: [phone]  Dept ATB:[phone]    Jose Zafar is a 73 y.o. female who presents today for her medical conditions/complaints as notedbelow. Jose Zafar is c/o of Hypertension (6mo follow up)      HPI:     HPI    Has been doing well. Has stayed really active going to [place] this winter. Is walking regularly. At least a few times per week. She has been compliant with her rosuvastatin and her low-cholesterol diet and her blood pressure medications metoprolol and the Aldactone. She is asymptomatic no chest pain no shortness of breath no dizziness no palpitations and no lower extremity edema. She has not had signs or symptoms of her hypothyroidism. Her energy level is good her hair and skin have not had any changes. She takes her thyroid medication regularly. Labs were OK with the exception of the ionized calcium. It was minimally elevated. Ionized calcium was 1.35. PTH and vitamin D levels were not done. She has been getting adequate D with sun exposure. She does not take any calcium supplementations. She does not have any signs or symptoms of hypercalcemia specifically no constipation no bone pain and no history of kidney stones.     BP Readings from Last 3 Encounters:   04/25/22 134/74   10/04/21 138/66   03/18/21 130/82          (goal 120/80)    Wt Readings from Last 3 Encounters:   04/25/22 171 lb (77.6 kg)   10/04/21 167 lb (75.8 kg)   03/18/21 170 lb (77.1 kg)        Past Medical History:   Diagnosis Date    Hyperlipidemia     Hypertension     Hyperthyroidism     IFG (impaired fasting glucose)     Migraine     unspecified not intractable with out status migrainosus     Mixed hyperlipidemia 10/4/2018      Past Surgical History:   Procedure Laterality Date    CHOLECYSTECTOMY  1980    OVARIAN CYST DRAINAGE      SPLENECTOMY  2001       Family History   Problem Relation Age of Onset    Heart Disease Mother     Osteoporosis Mother     Other Mother         blood disorder    Heart Attack Father     Other Sister         back pain    Kidney Disease Brother     Cancer Brother         lung    Rheum Arthritis Daughter        Social History     Tobacco Use    Smoking status: Never Smoker    Smokeless tobacco: Never Used   Substance Use Topics    Alcohol use: No      Current Outpatient Medications   Medication Sig Dispense Refill    spironolactone (ALDACTONE) 25 MG tablet TAKE ONE TABLET BY MOUTH DAILY 90 tablet 3    rosuvastatin (CRESTOR) 10 MG tablet TAKE ONE TABLET BY MOUTH NIGHTLY 90 tablet 3    metoprolol succinate (TOPROL XL) 50 MG extended release tablet Take 1 tablet by mouth daily 90 tablet 3    levothyroxine (SYNTHROID) 50 MCG tablet TAKE ONE TABLET BY MOUTH DAILY 90 tablet 3    latanoprost (XALATAN) 0.005 % ophthalmic solution 1 drop nightly       No current facility-administered medications for this visit.      Allergies   Allergen Reactions    Atorvastatin      Elevated LFT's    Pneumococcal Vaccines      Arm swelling , red       Health Maintenance   Topic Date Due    Meningococcal (ACWY) vaccine (1 - Risk start 2-23 months series) Never done    Hib vaccine (1 of 1 - Risk 1-dose series) Never done    Meningococcal B vaccine (1 of 4 - Increased Risk Bexsero 2-dose series) Never done    Shingles Vaccine (1 of 2) Never done    Lipids  09/28/2022    TSH  09/28/2022    Potassium  09/28/2022    Creatinine  09/28/2022    Depression Screen  09/29/2022    Annual Wellness Visit (AWV)  10/05/2022    A1C test (Diabetic or Prediabetic)  04/25/2023    Breast cancer screen  09/27/2023    Colorectal Cancer Screen  10/13/2024    Cervical cancer screen  11/25/2024    DTaP/Tdap/Td vaccine (3 - Td or Tdap) 11/25/2029    DEXA (modify frequency per FRAX score)  Completed    Flu vaccine  Completed    COVID-19 Vaccine  Completed    Hepatitis C screen  Completed    HIV screen  Completed    Hepatitis A vaccine  Aged Out    Hepatitis B vaccine  Aged Out       Subjective:      Review of Systems    Objective:     /74 (Site: Left Upper Arm, Position: Sitting, Cuff Size: Large Adult)   Pulse 73   Wt 171 lb (77.6 kg)   SpO2 96%   BMI 29.82 kg/m²     Physical Exam  Vitals and nursing note reviewed. Constitutional:       Appearance: Normal appearance. She is well-developed. HENT:      Head: Normocephalic and atraumatic. Eyes:      Conjunctiva/sclera: Conjunctivae normal.   Neck:      Thyroid: No thyromegaly. Vascular: No carotid bruit or JVD. Comments: No masses and no thyromegaly  Cardiovascular:      Rate and Rhythm: Normal rate and regular rhythm. Heart sounds: Normal heart sounds. No murmur heard. No friction rub. No gallop. Pulmonary:      Effort: Pulmonary effort is normal. No respiratory distress. Breath sounds: Normal breath sounds. Musculoskeletal:      Cervical back: Normal range of motion and neck supple. No tenderness. Right lower leg: No edema. Left lower leg: No edema. Lymphadenopathy:      Cervical: No cervical adenopathy. Skin:     General: Skin is warm. Neurological:      General: No focal deficit present. Mental Status: She is alert and oriented to person, place, and time. Psychiatric:         Mood and Affect: Mood normal.         Behavior: Behavior normal.         Thought Content: Thought content normal.         Judgment: Judgment normal.         Assessment/Plan:     1. Impaired fasting glucose  -     POCT glycosylated hemoglobin (Hb A1C)  -     Comprehensive Metabolic Panel; Future  2. Mixed hyperlipidemia  -     Lipid Panel; Future  3. Essential hypertension  4. Acquired hypothyroidism  -     TSH; Future  -     T4, Free; Future  5. Abnormal results of liver function studies  6. Hypercalcemia  -     PTH, Intact with Ionized Calcium; Future  -     Vitamin D 25 Hydroxy;  Future    For the hypercalcemia will at this point continue to monitor closely. If the PTH is significantly elevated or the calcium is beginning to rise then would do further more intensive evaluation including likely endocrine referral.    Continue on her current Crestor metoprolol and spironolactone for the hyperlipidemia and hypertension. Asymptomatic and tolerating his medications well. A1c well controlled at this time. Continue with the diet and exercise. Hypothyroidism asymptomatic well-controlled. Labs ordered for the fall. Lab Results   Component Value Date    CHOL 192 09/28/2021    TRIG 132 09/28/2021    HDL 60 09/28/2021    ALT 22 09/28/2021    AST 30 09/28/2021     09/28/2021    K 4.5 09/28/2021     09/28/2021    CREATININE 0.7 09/28/2021    BUN 15 09/28/2021    CO2 26 09/28/2021    TSH 1.94 09/28/2021    GLUF 123 08/23/2018    LABA1C 5.8 04/25/2022    LABA1C 6.2 03/18/2021    LABA1C 6.1 02/12/2020       Return in about 6 months (around 10/25/2022) for AWV. Multiple labs and other testing may have been ordered which may not be completely evident from the above note due to system interface incompatibilities. Patient given educational materials - see patientinstructions. Discussed use, benefit, and side effects of prescribed medications. All patient questions answered. Pt voiced understanding. Reviewed health maintenance. Instructed to continue current medications, diet andexercise. Patient agreed with treatment plan. Follow up as directed.      (Please note that portions of this note were completed with a voice-recognition program. Efforts were made to edit the dictation but occasionally words are mis-transcribed.)    Electronically signed by Brenda Briceno MD on 4/25/2022

## 2022-09-26 ENCOUNTER — TELEPHONE (OUTPATIENT)
Dept: FAMILY MEDICINE CLINIC | Age: 67
End: 2022-09-26

## 2022-09-26 NOTE — TELEPHONE ENCOUNTER
Went to her eye doctor and was Dx with early macular degeneration they wanted her to start [address] but was told to call PCP and verify this would not interfere with any of her current meds

## 2022-10-21 SDOH — HEALTH STABILITY: PHYSICAL HEALTH: ON AVERAGE, HOW MANY DAYS PER WEEK DO YOU ENGAGE IN MODERATE TO STRENUOUS EXERCISE (LIKE A BRISK WALK)?: 2 DAYS

## 2022-10-21 SDOH — HEALTH STABILITY: PHYSICAL HEALTH: ON AVERAGE, HOW MANY MINUTES DO YOU ENGAGE IN EXERCISE AT THIS LEVEL?: 20 MIN

## 2022-10-21 ASSESSMENT — LIFESTYLE VARIABLES
HOW OFTEN DO YOU HAVE SIX OR MORE DRINKS ON ONE OCCASION: 1
HOW OFTEN DO YOU HAVE A DRINK CONTAINING ALCOHOL: MONTHLY OR LESS
HOW MANY STANDARD DRINKS CONTAINING ALCOHOL DO YOU HAVE ON A TYPICAL DAY: 1
HOW OFTEN DO YOU HAVE A DRINK CONTAINING ALCOHOL: 2
HOW MANY STANDARD DRINKS CONTAINING ALCOHOL DO YOU HAVE ON A TYPICAL DAY: 1 OR 2

## 2022-10-21 ASSESSMENT — PATIENT HEALTH QUESTIONNAIRE - PHQ9
SUM OF ALL RESPONSES TO PHQ QUESTIONS 1-9: 0
2. FEELING DOWN, DEPRESSED OR HOPELESS: 0
1. LITTLE INTEREST OR PLEASURE IN DOING THINGS: 0
SUM OF ALL RESPONSES TO PHQ QUESTIONS 1-9: 0
SUM OF ALL RESPONSES TO PHQ9 QUESTIONS 1 & 2: 0

## 2022-10-24 LAB
ALBUMIN/GLOBULIN RATIO: 1.65 G/DL
ALBUMIN: 5.1 G/DL (ref 3.5–5)
ALP BLD-CCNC: 94 UNITS/L (ref 38–126)
ALT SERPL-CCNC: 21 UNITS/L (ref 4–35)
ANION GAP SERPL CALCULATED.3IONS-SCNC: 12.4 MMOL/L
AST SERPL-CCNC: 23 UNITS/L (ref 14–36)
BILIRUB SERPL-MCNC: 0.6 MG/DL (ref 0.2–1.3)
BUN BLDV-MCNC: 18 MG/DL (ref 7–17)
CALCIUM IONIZED: 1.34 MMOL/L (ref 1.13–1.33)
CALCIUM SERPL-MCNC: 10.2 MG/DL (ref 8.4–10.2)
CHLORIDE BLD-SCNC: 102 MMOL/L (ref 98–120)
CHOLESTEROL/HDL RATIO: 4.06 RATIO (ref 0–4.5)
CHOLESTEROL: 219 MG/DL (ref 50–200)
CO2: 27 MMOL/L (ref 22–31)
CREAT SERPL-MCNC: 0.7 MG/DL (ref 0.5–1)
GFR CALCULATED: > 60
GLOBULIN: 3.1 G/DL
GLUCOSE: 105 MG/DL (ref 65–105)
HDLC SERPL-MCNC: 54 MG/DL (ref 36–68)
LDL CHOLESTEROL CALCULATED: 117.6 MG/DL (ref 0–160)
POTASSIUM SERPL-SCNC: 4.4 MMOL/L (ref 3.6–5)
SODIUM BLD-SCNC: 141 MMOL/L (ref 135–145)
T4 FREE: 1.02 NG/DL (ref 0.78–2.19)
TOTAL PROTEIN, SERUM: 8.2 G/DL (ref 6.3–8.2)
TRIGL SERPL-MCNC: 237 MG/DL (ref 10–250)
TSH SERPL DL<=0.05 MIU/L-ACNC: 1.55 MIU/ML (ref 0.49–4.67)
VITAMIN D 25-HYDROXY: 36 NG/ML (ref 30–100)
VLDLC SERPL CALC-MCNC: 47 MG/DL (ref 0–50)

## 2022-10-25 LAB — PTH INTACT: 49.9 PG/ML (ref 14–72)

## 2022-10-28 ENCOUNTER — OFFICE VISIT (OUTPATIENT)
Dept: FAMILY MEDICINE CLINIC | Age: 67
End: 2022-10-28
Payer: MEDICARE

## 2022-10-28 VITALS
SYSTOLIC BLOOD PRESSURE: 132 MMHG | DIASTOLIC BLOOD PRESSURE: 84 MMHG | WEIGHT: 170 LBS | HEART RATE: 78 BPM | OXYGEN SATURATION: 95 % | RESPIRATION RATE: 16 BRPM | BODY MASS INDEX: 29.64 KG/M2

## 2022-10-28 DIAGNOSIS — R73.01 IMPAIRED FASTING GLUCOSE: ICD-10-CM

## 2022-10-28 DIAGNOSIS — I10 ESSENTIAL HYPERTENSION: ICD-10-CM

## 2022-10-28 DIAGNOSIS — E03.9 ACQUIRED HYPOTHYROIDISM: ICD-10-CM

## 2022-10-28 DIAGNOSIS — Z00.00 INITIAL MEDICARE ANNUAL WELLNESS VISIT: Primary | ICD-10-CM

## 2022-10-28 DIAGNOSIS — E78.2 MIXED HYPERLIPIDEMIA: ICD-10-CM

## 2022-10-28 DIAGNOSIS — Z23 NEED FOR INFLUENZA VACCINATION: ICD-10-CM

## 2022-10-28 PROCEDURE — 3078F DIAST BP <80 MM HG: CPT | Performed by: FAMILY MEDICINE

## 2022-10-28 PROCEDURE — 3074F SYST BP LT 130 MM HG: CPT | Performed by: FAMILY MEDICINE

## 2022-10-28 PROCEDURE — 1123F ACP DISCUSS/DSCN MKR DOCD: CPT | Performed by: FAMILY MEDICINE

## 2022-10-28 PROCEDURE — 3017F COLORECTAL CA SCREEN DOC REV: CPT | Performed by: FAMILY MEDICINE

## 2022-10-28 PROCEDURE — PBSHW INFLUENZA, FLUAD, (AGE 65 Y+), IM, PF, 0.5 ML: Performed by: FAMILY MEDICINE

## 2022-10-28 PROCEDURE — G0438 PPPS, INITIAL VISIT: HCPCS | Performed by: FAMILY MEDICINE

## 2022-10-28 PROCEDURE — G0008 ADMIN INFLUENZA VIRUS VAC: HCPCS | Performed by: FAMILY MEDICINE

## 2022-10-28 PROCEDURE — G8484 FLU IMMUNIZE NO ADMIN: HCPCS | Performed by: FAMILY MEDICINE

## 2022-10-28 PROCEDURE — 99213 OFFICE O/P EST LOW 20 MIN: CPT | Performed by: FAMILY MEDICINE

## 2022-10-28 RX ORDER — METOPROLOL SUCCINATE 50 MG/1
50 TABLET, EXTENDED RELEASE ORAL DAILY
Qty: 90 TABLET | Refills: 3 | Status: SHIPPED | OUTPATIENT
Start: 2022-10-28

## 2022-10-28 RX ORDER — SPIRONOLACTONE 25 MG/1
TABLET ORAL
Qty: 90 TABLET | Refills: 3 | Status: SHIPPED | OUTPATIENT
Start: 2022-10-28

## 2022-10-28 RX ORDER — ROSUVASTATIN CALCIUM 10 MG/1
TABLET, COATED ORAL
Qty: 90 TABLET | Refills: 3 | Status: SHIPPED | OUTPATIENT
Start: 2022-10-28

## 2022-10-28 RX ORDER — LEVOTHYROXINE SODIUM 0.05 MG/1
TABLET ORAL
Qty: 90 TABLET | Refills: 3 | Status: SHIPPED | OUTPATIENT
Start: 2022-10-28

## 2022-10-28 SDOH — ECONOMIC STABILITY: FOOD INSECURITY: WITHIN THE PAST 12 MONTHS, YOU WORRIED THAT YOUR FOOD WOULD RUN OUT BEFORE YOU GOT MONEY TO BUY MORE.: NEVER TRUE

## 2022-10-28 SDOH — ECONOMIC STABILITY: FOOD INSECURITY: WITHIN THE PAST 12 MONTHS, THE FOOD YOU BOUGHT JUST DIDN'T LAST AND YOU DIDN'T HAVE MONEY TO GET MORE.: NEVER TRUE

## 2022-10-28 ASSESSMENT — SOCIAL DETERMINANTS OF HEALTH (SDOH): HOW HARD IS IT FOR YOU TO PAY FOR THE VERY BASICS LIKE FOOD, HOUSING, MEDICAL CARE, AND HEATING?: NOT VERY HARD

## 2022-10-28 NOTE — PATIENT INSTRUCTIONS
Vitamin D 400 IU daily to help with the calcium. Personalized Preventive Plan for Yovanny Leiva - 10/28/2022  Medicare offers a range of preventive health benefits. Some of the tests and screenings are paid in full while other may be subject to a deductible, co-insurance, and/or copay. Some of these benefits include a comprehensive review of your medical history including lifestyle, illnesses that may run in your family, and various assessments and screenings as appropriate. After reviewing your medical record and screening and assessments performed today your provider may have ordered immunizations, labs, imaging, and/or referrals for you. A list of these orders (if applicable) as well as your Preventive Care list are included within your After Visit Summary for your review. Other Preventive Recommendations:    A preventive eye exam performed by an eye specialist is recommended every 1-2 years to screen for glaucoma; cataracts, macular degeneration, and other eye disorders. A preventive dental visit is recommended every 6 months. Try to get at least 150 minutes of exercise per week or 10,000 steps per day on a pedometer . Order or download the FREE \"Exercise & Physical Activity: Your Everyday Guide\" from The [employer]. Call 5-132.306.1687 or search [employer] online. You need 8903-7503 mg of calcium and 9761-7518 IU of vitamin D per day. It is possible to meet your calcium requirement with diet alone, but a vitamin D supplement is usually necessary to meet this goal.  When exposed to the sun, use a sunscreen that protects against both UVA and UVB radiation with an SPF of 30 or greater. Reapply every 2 to 3 hours or after sweating, drying off with a towel, or swimming. Always wear a seat belt when traveling in a car. Always wear a helmet when riding a bicycle or motorcycle.

## 2022-10-28 NOTE — PROGRESS NOTES
Medicare Annual Wellness Visit    Miguelina Pang is here for Medicare AWV    Assessment & Plan   Initial Medicare annual wellness visit  Essential hypertension  -     metoprolol succinate (TOPROL XL) 50 MG extended release tablet; Take 1 tablet by mouth daily, Disp-90 tablet, R-3Normal  -     spironolactone (ALDACTONE) 25 MG tablet; TAKE ONE TABLET BY MOUTH DAILY, Disp-90 tablet, R-3Normal  Mixed hyperlipidemia  -     rosuvastatin (CRESTOR) 10 MG tablet; TAKE ONE TABLET BY MOUTH NIGHTLY, Disp-90 tablet, R-3Normal  Need for influenza vaccination  -     Influenza, FLUAD, (age 72 y+), IM, Preservative Free, 0.5 mL  Acquired hypothyroidism  Impaired fasting glucose    Recommendations for Preventive Services Due: see orders and patient instructions/AVS.    IFG/ HTN and hyperlipidemia are all stable. Hypercalcemia likewise stable. Ensure adequate Vit D with the decreased calcium intake. Recommended screening schedule for the next 5-10 years is provided to the patient in written form: see Patient Instructions/AVS.     Return in about 6 months (around 4/28/2023) for Medication recheck, HTN. Subjective   The following acute and/or chronic problems were also addressed today:  HTN/Hyperlipidemia/IFG: Well controlled at this time. Taking meds regularly. No CP/ Palp/ no SOB/ no LE edema/ no side effects. Hypercalcemia: reviewed the recent labs-- has no signs of her hypercalcemia- no constipation, no new or increasing joint pain. No history of renal stones. Labs are normal with the exception of the mildly elevated ionized calcium level. Is planning on getting back to [place] this winter. Patient's complete Health Risk Assessment and screening values have been reviewed and are found in Flowsheets. The following problems were reviewed today and where indicated follow up appointments were made and/or referrals ordered.     Positive Risk Factor Screenings with Interventions:             General Health and ACP:  General  In general, how would you say your health is?: Very Good  In the past 7 days, have you experienced any of the following: New or Increased Pain, New or Increased Fatigue, Loneliness, Social Isolation, Stress or Anger?: No  Do you get the social and emotional support that you need?: Yes  Do you have a Living Will?: (!) No    Advance Directives       Power of Ziggy Patel Will ACP-Advance Directive ACP-Power of     Not on File Not on File Not on File Not on File        General Health Risk Interventions:  No Living Will: completing at this time     Hearing/Vision:  Do you or your family notice any trouble with your hearing that hasn't been managed with hearing aids?: No  Do you have difficulty driving, watching TV, or doing any of your daily activities because of your eyesight?: (!) Yes  Have you had an eye exam within the past year?: Yes  No results found. Hearing/Vision Interventions:  Vision concerns: Following with her specialists-- macular degeneration noted    Safety:  Do you have working smoke detectors?: Yes  Do you have any tripping hazards - loose or unsecured carpets or rugs?: No  Do you have any tripping hazards - clutter in doorways, halls, or stairs?: No  Do you have either shower bars, grab bars, non-slip mats or non-slip surfaces in your shower or bathtub?: (!) No  Do all of your stairways have a railing or banister?: Yes  Do you always fasten your seatbelt when you are in a car?: Yes  Safety Interventions:  Home safety tips provided           Objective   Vitals:    10/28/22 1026   BP: 132/84   Site: Left Upper Arm   Position: Sitting   Cuff Size: Medium Adult   Pulse: 78   Resp: 16   SpO2: 95%   Weight: 170 lb (77.1 kg)      Body mass index is 29.64 kg/m². Physical Exam  Vitals and nursing note reviewed. Constitutional:       Appearance: Normal appearance. She is well-developed. HENT:      Head: Normocephalic and atraumatic.    Eyes:      Conjunctiva/sclera: Conjunctivae normal.   Neck:      Thyroid: No thyromegaly. Vascular: No carotid bruit or JVD. Comments: No masses and no thyromegaly  Cardiovascular:      Rate and Rhythm: Normal rate and regular rhythm. Heart sounds: Normal heart sounds. No murmur heard. No friction rub. No gallop. Pulmonary:      Effort: Pulmonary effort is normal. No respiratory distress. Breath sounds: Normal breath sounds. Musculoskeletal:      Cervical back: Normal range of motion and neck supple. No tenderness. Right lower leg: No edema. Left lower leg: No edema. Lymphadenopathy:      Cervical: No cervical adenopathy. Skin:     General: Skin is warm. Neurological:      General: No focal deficit present. Mental Status: She is alert and oriented to person, place, and time. Psychiatric:         Mood and Affect: Mood normal.         Behavior: Behavior normal.         Thought Content: Thought content normal.         Judgment: Judgment normal.            Allergies   Allergen Reactions    Atorvastatin      Elevated LFT's    Pneumococcal Vaccines      Arm swelling , red     Prior to Visit Medications    Medication Sig Taking?  Authorizing Provider   levothyroxine (SYNTHROID) 50 MCG tablet TAKE ONE TABLET BY MOUTH DAILY Yes Fly Colindres MD   metoprolol succinate (TOPROL XL) 50 MG extended release tablet Take 1 tablet by mouth daily Yes Fly Colindres MD   rosuvastatin (CRESTOR) 10 MG tablet TAKE ONE TABLET BY MOUTH NIGHTLY Yes Fly Colindres MD   spironolactone (ALDACTONE) 25 MG tablet TAKE ONE TABLET BY MOUTH DAILY Yes Fly Colindres MD   latanoprost (XALATAN) 0.005 % ophthalmic solution 1 drop nightly  Historical Provider, MD Jones (Including outside providers/suppliers regularly involved in providing care):   Patient Care Team:  Fly Colindres MD as PCP - General (Family Medicine)  Fly Colindres MD as PCP - [address] [place] [address]  [facility]  Socorro Jackson MD (Ophthalmology) Reviewed and updated this visit:  Tobacco  Allergies  Meds  Problems  Med Hx  Surg Hx  Soc Hx  Fam Hx

## 2023-04-28 SDOH — ECONOMIC STABILITY: FOOD INSECURITY: WITHIN THE PAST 12 MONTHS, YOU WORRIED THAT YOUR FOOD WOULD RUN OUT BEFORE YOU GOT MONEY TO BUY MORE.: NEVER TRUE

## 2023-04-28 SDOH — ECONOMIC STABILITY: HOUSING INSECURITY
IN THE LAST 12 MONTHS, WAS THERE A TIME WHEN YOU DID NOT HAVE A STEADY PLACE TO SLEEP OR SLEPT IN A SHELTER (INCLUDING NOW)?: NO

## 2023-04-28 SDOH — ECONOMIC STABILITY: INCOME INSECURITY: HOW HARD IS IT FOR YOU TO PAY FOR THE VERY BASICS LIKE FOOD, HOUSING, MEDICAL CARE, AND HEATING?: NOT HARD AT ALL

## 2023-04-28 SDOH — ECONOMIC STABILITY: FOOD INSECURITY: WITHIN THE PAST 12 MONTHS, THE FOOD YOU BOUGHT JUST DIDN'T LAST AND YOU DIDN'T HAVE MONEY TO GET MORE.: NEVER TRUE

## 2023-05-01 ENCOUNTER — OFFICE VISIT (OUTPATIENT)
Dept: FAMILY MEDICINE CLINIC | Age: 68
End: 2023-05-01
Payer: MEDICARE

## 2023-05-01 VITALS
HEIGHT: 64 IN | DIASTOLIC BLOOD PRESSURE: 84 MMHG | HEART RATE: 83 BPM | BODY MASS INDEX: 29.37 KG/M2 | SYSTOLIC BLOOD PRESSURE: 144 MMHG | WEIGHT: 172 LBS | OXYGEN SATURATION: 95 %

## 2023-05-01 DIAGNOSIS — E83.52 HYPERCALCEMIA: ICD-10-CM

## 2023-05-01 DIAGNOSIS — E03.9 ACQUIRED HYPOTHYROIDISM: ICD-10-CM

## 2023-05-01 DIAGNOSIS — I10 ESSENTIAL HYPERTENSION: ICD-10-CM

## 2023-05-01 DIAGNOSIS — E78.2 MIXED HYPERLIPIDEMIA: ICD-10-CM

## 2023-05-01 DIAGNOSIS — R94.5 ABNORMAL RESULTS OF LIVER FUNCTION STUDIES: ICD-10-CM

## 2023-05-01 DIAGNOSIS — R73.01 IMPAIRED FASTING GLUCOSE: Primary | ICD-10-CM

## 2023-05-01 LAB — HBA1C MFR BLD: 5.9 %

## 2023-05-01 PROCEDURE — 99213 OFFICE O/P EST LOW 20 MIN: CPT | Performed by: FAMILY MEDICINE

## 2023-05-01 PROCEDURE — 83036 HEMOGLOBIN GLYCOSYLATED A1C: CPT | Performed by: FAMILY MEDICINE

## 2023-05-01 RX ORDER — VIT A/VIT C/VIT E/ZINC/COPPER 2148-113
TABLET ORAL
COMMUNITY

## 2023-05-01 RX ORDER — OMEGA-3S/DHA/EPA/FISH OIL/D3 300MG-1000
400 CAPSULE ORAL DAILY
COMMUNITY

## 2023-05-01 ASSESSMENT — PATIENT HEALTH QUESTIONNAIRE - PHQ9
SUM OF ALL RESPONSES TO PHQ QUESTIONS 1-9: 0
2. FEELING DOWN, DEPRESSED OR HOPELESS: 0
SUM OF ALL RESPONSES TO PHQ9 QUESTIONS 1 & 2: 0
SUM OF ALL RESPONSES TO PHQ QUESTIONS 1-9: 0
1. LITTLE INTEREST OR PLEASURE IN DOING THINGS: 0

## 2023-05-01 NOTE — PROGRESS NOTES
[facility]  [address]  Dept: [phone]  Dept MKN:[phone]    Nasra Lares is a 78 y.o. female who presents today for her medical conditions/complaints as notedbelow. Nasra Lares is c/o of Blood Sugar Problem (Impaired fasting glucose)      Assessment/Plan:     1. Impaired fasting glucose  -     POCT glycosylated hemoglobin (Hb A1C)  2. Essential hypertension  -     Comprehensive Metabolic Panel; Future  3. Acquired hypothyroidism  -     TSH; Future  -     T4, Free; Future  4. Mixed hyperlipidemia  -     Lipid Panel; Future  5. Abnormal results of liver function studies  -     Comprehensive Metabolic Panel; Future  6. Hypercalcemia  -     PTH, Intact with Ionized Calcium; Future    Blood sugar has been very stable. Continue work on diet and exercise. No changes in current medications. Hypothyroidism is asymptomatic and euthyroid. Continue on the 50 mcg daily. Hyperlipidemia is asymptomatic and well controlled on the Crestor 10 mg continue. Metoprolol 50 controlling the pressure well asymptomatic no changes    With a history of hypercalcemia has been improved on last checks. Continue to follow with ionized calcium and PTH. Liver function tests also were most recently. Recheck of these    Lab Results   Component Value Date    CHOL 219 (H) 10/24/2022    TRIG 237 10/24/2022    HDL 54 10/24/2022    ALT 21 10/24/2022    AST 23 10/24/2022     10/24/2022    K 4.4 10/24/2022     10/24/2022    CREATININE 0.7 10/24/2022    BUN 18 (H) 10/24/2022    CO2 27 10/24/2022    TSH 1.55 10/24/2022    GLUF 123 08/23/2018    LABA1C 5.9 05/01/2023    LABA1C 5.8 04/25/2022    LABA1C 6.2 03/18/2021       Return in about 6 months (around 11/1/2023) for AWV, Medication recheck. Subjective:      HPI:     HPI    Is usually doing really well at home. BP is usually in the 120/70-80 range. Feels good.   NO CP/ no SOB/ no

## 2023-10-17 LAB
ALBUMIN/GLOBULIN RATIO: 1.6 G/DL
ALBUMIN: 4.8 G/DL (ref 3.5–5)
ALP BLD-CCNC: 79 UNITS/L (ref 38–126)
ALT SERPL-CCNC: 19 UNITS/L (ref 4–35)
ANION GAP SERPL CALCULATED.3IONS-SCNC: 11.3 MMOL/L (ref 12–16)
AST SERPL-CCNC: 24 UNITS/L (ref 14–36)
BILIRUB SERPL-MCNC: 0.6 MG/DL (ref 0.2–1.3)
BUN BLDV-MCNC: 12 MG/DL (ref 7–17)
CALCIUM IONIZED: 1.35 MMOL/L (ref 1.13–1.33)
CALCIUM SERPL-MCNC: 10.2 MG/DL (ref 8.4–10.2)
CHLORIDE BLD-SCNC: 103 MMOL/L (ref 98–120)
CHOLESTEROL/HDL RATIO: 3.56 RATIO (ref 0–4.5)
CHOLESTEROL: 210 MG/DL (ref 50–200)
CO2: 25 MMOL/L (ref 22–31)
CREAT SERPL-MCNC: 0.7 MG/DL (ref 0.5–1)
GFR CALCULATED: > 60
GLOBULIN: 3 G/DL
GLUCOSE: 101 MG/DL (ref 65–105)
HDLC SERPL-MCNC: 59 MG/DL (ref 36–68)
LDL CHOLESTEROL CALCULATED: 112.6 MG/DL (ref 0–160)
PARATHYROID HORMONE INTACT: 56.9 PG/ML (ref 14–72)
POTASSIUM SERPL-SCNC: 4.4 MMOL/L (ref 3.6–5)
SODIUM BLD-SCNC: 140 MMOL/L (ref 135–145)
T4 FREE: 1.05 NG/DL (ref 0.78–2.19)
TOTAL PROTEIN, SERUM: 7.9 G/DL (ref 6.3–8.2)
TRIGL SERPL-MCNC: 192 MG/DL (ref 10–250)
TSH SERPL DL<=0.05 MIU/L-ACNC: 1.58 MIU/ML (ref 0.49–4.67)
VLDLC SERPL CALC-MCNC: 38 MG/DL (ref 0–50)

## 2023-10-17 SDOH — HEALTH STABILITY: PHYSICAL HEALTH: ON AVERAGE, HOW MANY MINUTES DO YOU ENGAGE IN EXERCISE AT THIS LEVEL?: 30 MIN

## 2023-10-17 SDOH — HEALTH STABILITY: PHYSICAL HEALTH: ON AVERAGE, HOW MANY DAYS PER WEEK DO YOU ENGAGE IN MODERATE TO STRENUOUS EXERCISE (LIKE A BRISK WALK)?: 2 DAYS

## 2023-10-17 ASSESSMENT — PATIENT HEALTH QUESTIONNAIRE - PHQ9
2. FEELING DOWN, DEPRESSED OR HOPELESS: 0
SUM OF ALL RESPONSES TO PHQ QUESTIONS 1-9: 0
1. LITTLE INTEREST OR PLEASURE IN DOING THINGS: 0
SUM OF ALL RESPONSES TO PHQ9 QUESTIONS 1 & 2: 0
SUM OF ALL RESPONSES TO PHQ QUESTIONS 1-9: 0

## 2023-10-17 ASSESSMENT — LIFESTYLE VARIABLES
HOW OFTEN DO YOU HAVE A DRINK CONTAINING ALCOHOL: 2
HOW OFTEN DO YOU HAVE A DRINK CONTAINING ALCOHOL: MONTHLY OR LESS
HOW MANY STANDARD DRINKS CONTAINING ALCOHOL DO YOU HAVE ON A TYPICAL DAY: 1 OR 2
HOW OFTEN DO YOU HAVE SIX OR MORE DRINKS ON ONE OCCASION: 1
HOW MANY STANDARD DRINKS CONTAINING ALCOHOL DO YOU HAVE ON A TYPICAL DAY: 1

## 2023-10-30 ENCOUNTER — OFFICE VISIT (OUTPATIENT)
Dept: FAMILY MEDICINE CLINIC | Age: 68
End: 2023-10-30
Payer: MEDICARE

## 2023-10-30 VITALS
BODY MASS INDEX: 29.71 KG/M2 | SYSTOLIC BLOOD PRESSURE: 132 MMHG | DIASTOLIC BLOOD PRESSURE: 80 MMHG | HEIGHT: 64 IN | OXYGEN SATURATION: 97 % | HEART RATE: 80 BPM | WEIGHT: 174 LBS

## 2023-10-30 DIAGNOSIS — E78.2 MIXED HYPERLIPIDEMIA: ICD-10-CM

## 2023-10-30 DIAGNOSIS — E03.9 ACQUIRED HYPOTHYROIDISM: ICD-10-CM

## 2023-10-30 DIAGNOSIS — Z12.31 ENCOUNTER FOR SCREENING MAMMOGRAM FOR BREAST CANCER: ICD-10-CM

## 2023-10-30 DIAGNOSIS — Z00.00 MEDICARE ANNUAL WELLNESS VISIT, SUBSEQUENT: Primary | ICD-10-CM

## 2023-10-30 DIAGNOSIS — I10 ESSENTIAL HYPERTENSION: ICD-10-CM

## 2023-10-30 PROCEDURE — 1123F ACP DISCUSS/DSCN MKR DOCD: CPT | Performed by: FAMILY MEDICINE

## 2023-10-30 PROCEDURE — 3074F SYST BP LT 130 MM HG: CPT | Performed by: FAMILY MEDICINE

## 2023-10-30 PROCEDURE — G0439 PPPS, SUBSEQ VISIT: HCPCS | Performed by: FAMILY MEDICINE

## 2023-10-30 PROCEDURE — G8484 FLU IMMUNIZE NO ADMIN: HCPCS | Performed by: FAMILY MEDICINE

## 2023-10-30 PROCEDURE — 3078F DIAST BP <80 MM HG: CPT | Performed by: FAMILY MEDICINE

## 2023-10-30 PROCEDURE — 3017F COLORECTAL CA SCREEN DOC REV: CPT | Performed by: FAMILY MEDICINE

## 2023-10-30 RX ORDER — SPIRONOLACTONE 25 MG/1
TABLET ORAL
Qty: 90 TABLET | Refills: 3 | Status: SHIPPED | OUTPATIENT
Start: 2023-10-30

## 2023-10-30 RX ORDER — LEVOTHYROXINE SODIUM 0.05 MG/1
TABLET ORAL
Qty: 90 TABLET | Refills: 3 | Status: SHIPPED | OUTPATIENT
Start: 2023-10-30

## 2023-10-30 RX ORDER — ROSUVASTATIN CALCIUM 10 MG/1
TABLET, COATED ORAL
Qty: 90 TABLET | Refills: 3 | Status: SHIPPED | OUTPATIENT
Start: 2023-10-30

## 2023-10-30 RX ORDER — METOPROLOL SUCCINATE 50 MG/1
50 TABLET, EXTENDED RELEASE ORAL DAILY
Qty: 90 TABLET | Refills: 3 | Status: SHIPPED | OUTPATIENT
Start: 2023-10-30

## 2023-10-30 NOTE — PATIENT INSTRUCTIONS
lifestyle, illnesses that may run in your family, and various assessments and screenings as appropriate. After reviewing your medical record and screening and assessments performed today your provider may have ordered immunizations, labs, imaging, and/or referrals for you. A list of these orders (if applicable) as well as your Preventive Care list are included within your After Visit Summary for your review. Other Preventive Recommendations:    A preventive eye exam performed by an eye specialist is recommended every 1-2 years to screen for glaucoma; cataracts, macular degeneration, and other eye disorders. A preventive dental visit is recommended every 6 months. Try to get at least 150 minutes of exercise per week or 10,000 steps per day on a pedometer . Order or download the FREE \"Exercise & Physical Activity: Your Everyday Guide\" from [employer]. Call 3-918.656.5424 or search The [employer] online. You need 4346-3519 mg of calcium and 9533-5530 IU of vitamin D per day. It is possible to meet your calcium requirement with diet alone, but a vitamin D supplement is usually necessary to meet this goal.  When exposed to the sun, use a sunscreen that protects against both UVA and UVB radiation with an SPF of 30 or greater. Reapply every 2 to 3 hours or after sweating, drying off with a towel, or swimming. Always wear a seat belt when traveling in a car. Always wear a helmet when riding a bicycle or motorcycle.

## 2023-10-30 NOTE — PROGRESS NOTES
Judgment: Judgment normal.            Allergies   Allergen Reactions    Atorvastatin      Elevated LFT's    Pneumococcal Vaccines      Arm swelling , red     Prior to Visit Medications    Medication Sig Taking?  Authorizing Provider   levothyroxine (SYNTHROID) 50 MCG tablet TAKE ONE TABLET BY MOUTH DAILY Yes Shimon Cano MD   metoprolol succinate (TOPROL XL) 50 MG extended release tablet Take 1 tablet by mouth daily Yes Shimon Cano MD   rosuvastatin (CRESTOR) 10 MG tablet TAKE ONE TABLET BY MOUTH NIGHTLY Yes Shimon Cano MD   spironolactone (ALDACTONE) 25 MG tablet TAKE ONE TABLET BY MOUTH DAILY Yes Gali MCKNIGHT MD   vitamin D3 (CHOLECALCIFEROL) 10 MCG (400 UNIT) TABS tablet Take 1 tablet by mouth daily Yes ProviderSonya MD   Multiple Vitamins-Minerals (PRESERVISION AREDS) TABS Take by mouth Yes ProviderSonya MD   latanoprost (XALATAN) 0.005 % ophthalmic solution 1 drop nightly Yes ProviderSonya MD       CareTeam (Including outside providers/suppliers regularly involved in providing care):   Patient Care Team:  Shimon Cano MD as PCP - General (Family Medicine)  Shimon Cano MD as PCP - Empaneled Provider  Aleta Baldwin MD (Ophthalmology)     Reviewed and updated this visit:  Tobacco  Allergies  Meds  Problems  Med Hx  Surg Hx  Soc Hx  Fam Hx

## 2023-12-30 DIAGNOSIS — I10 ESSENTIAL HYPERTENSION: ICD-10-CM

## 2023-12-30 DIAGNOSIS — E03.9 ACQUIRED HYPOTHYROIDISM: ICD-10-CM

## 2023-12-30 DIAGNOSIS — E78.2 MIXED HYPERLIPIDEMIA: ICD-10-CM

## 2024-01-02 RX ORDER — LEVOTHYROXINE SODIUM 0.05 MG/1
TABLET ORAL
Qty: 90 TABLET | Refills: 3 | Status: SHIPPED | OUTPATIENT
Start: 2024-01-02

## 2024-01-02 RX ORDER — ROSUVASTATIN CALCIUM 10 MG/1
TABLET, COATED ORAL
Qty: 90 TABLET | Refills: 3 | Status: SHIPPED | OUTPATIENT
Start: 2024-01-02

## 2024-01-02 RX ORDER — METOPROLOL SUCCINATE 50 MG/1
50 TABLET, EXTENDED RELEASE ORAL DAILY
Qty: 90 TABLET | Refills: 3 | Status: SHIPPED | OUTPATIENT
Start: 2024-01-02

## 2024-01-02 NOTE — TELEPHONE ENCOUNTER
Ramona Faustin is requesting a refill on the following medication(s):  Requested Prescriptions     Pending Prescriptions Disp Refills    levothyroxine (SYNTHROID) 50 MCG tablet [Pharmacy Med Name: Levothyroxine Sodium 50 MCG Oral Tablet] 90 tablet 3     Sig: Take 1 tablet by mouth once daily    rosuvastatin (CRESTOR) 10 MG tablet [Pharmacy Med Name: Rosuvastatin Calcium 10 MG Oral Tablet] 90 tablet 3     Sig: Take 1 tablet by mouth nightly    metoprolol succinate (TOPROL XL) 50 MG extended release tablet [Pharmacy Med Name: Metoprolol Succinate ER 50 MG Oral Tablet Extended Release 24 Hour] 90 tablet 3     Sig: Take 1 tablet by mouth once daily       Last Visit Date (If Applicable):  10/30/2023    Next Visit Date:    4/29/2024

## 2024-04-26 ASSESSMENT — PATIENT HEALTH QUESTIONNAIRE - PHQ9
1. LITTLE INTEREST OR PLEASURE IN DOING THINGS: NOT AT ALL
SUM OF ALL RESPONSES TO PHQ QUESTIONS 1-9: 0
SUM OF ALL RESPONSES TO PHQ9 QUESTIONS 1 & 2: 0
2. FEELING DOWN, DEPRESSED OR HOPELESS: NOT AT ALL
SUM OF ALL RESPONSES TO PHQ9 QUESTIONS 1 & 2: 0
SUM OF ALL RESPONSES TO PHQ QUESTIONS 1-9: 0
2. FEELING DOWN, DEPRESSED OR HOPELESS: NOT AT ALL
SUM OF ALL RESPONSES TO PHQ QUESTIONS 1-9: 0
1. LITTLE INTEREST OR PLEASURE IN DOING THINGS: NOT AT ALL
SUM OF ALL RESPONSES TO PHQ QUESTIONS 1-9: 0

## 2024-04-29 ENCOUNTER — OFFICE VISIT (OUTPATIENT)
Dept: FAMILY MEDICINE CLINIC | Age: 69
End: 2024-04-29
Payer: MEDICARE

## 2024-04-29 VITALS
HEART RATE: 60 BPM | BODY MASS INDEX: 28.94 KG/M2 | DIASTOLIC BLOOD PRESSURE: 82 MMHG | WEIGHT: 166 LBS | SYSTOLIC BLOOD PRESSURE: 130 MMHG | OXYGEN SATURATION: 97 %

## 2024-04-29 DIAGNOSIS — R73.01 IMPAIRED FASTING GLUCOSE: ICD-10-CM

## 2024-04-29 DIAGNOSIS — E78.2 MIXED HYPERLIPIDEMIA: ICD-10-CM

## 2024-04-29 DIAGNOSIS — R94.5 ABNORMAL RESULTS OF LIVER FUNCTION STUDIES: ICD-10-CM

## 2024-04-29 DIAGNOSIS — E03.9 ACQUIRED HYPOTHYROIDISM: ICD-10-CM

## 2024-04-29 DIAGNOSIS — I10 ESSENTIAL HYPERTENSION: Primary | ICD-10-CM

## 2024-04-29 PROCEDURE — 1123F ACP DISCUSS/DSCN MKR DOCD: CPT | Performed by: FAMILY MEDICINE

## 2024-04-29 PROCEDURE — 1090F PRES/ABSN URINE INCON ASSESS: CPT | Performed by: FAMILY MEDICINE

## 2024-04-29 PROCEDURE — G2211 COMPLEX E/M VISIT ADD ON: HCPCS | Performed by: FAMILY MEDICINE

## 2024-04-29 PROCEDURE — 99214 OFFICE O/P EST MOD 30 MIN: CPT | Performed by: FAMILY MEDICINE

## 2024-04-29 PROCEDURE — G8427 DOCREV CUR MEDS BY ELIG CLIN: HCPCS | Performed by: FAMILY MEDICINE

## 2024-04-29 PROCEDURE — G8399 PT W/DXA RESULTS DOCUMENT: HCPCS | Performed by: FAMILY MEDICINE

## 2024-04-29 PROCEDURE — 1036F TOBACCO NON-USER: CPT | Performed by: FAMILY MEDICINE

## 2024-04-29 PROCEDURE — 3079F DIAST BP 80-89 MM HG: CPT | Performed by: FAMILY MEDICINE

## 2024-04-29 PROCEDURE — 3017F COLORECTAL CA SCREEN DOC REV: CPT | Performed by: FAMILY MEDICINE

## 2024-04-29 PROCEDURE — 99212 OFFICE O/P EST SF 10 MIN: CPT | Performed by: FAMILY MEDICINE

## 2024-04-29 PROCEDURE — G8417 CALC BMI ABV UP PARAM F/U: HCPCS | Performed by: FAMILY MEDICINE

## 2024-04-29 PROCEDURE — 3075F SYST BP GE 130 - 139MM HG: CPT | Performed by: FAMILY MEDICINE

## 2024-04-29 SDOH — ECONOMIC STABILITY: FOOD INSECURITY: WITHIN THE PAST 12 MONTHS, THE FOOD YOU BOUGHT JUST DIDN'T LAST AND YOU DIDN'T HAVE MONEY TO GET MORE.: NEVER TRUE

## 2024-04-29 SDOH — ECONOMIC STABILITY: INCOME INSECURITY: HOW HARD IS IT FOR YOU TO PAY FOR THE VERY BASICS LIKE FOOD, HOUSING, MEDICAL CARE, AND HEATING?: NOT HARD AT ALL

## 2024-04-29 SDOH — ECONOMIC STABILITY: FOOD INSECURITY: WITHIN THE PAST 12 MONTHS, YOU WORRIED THAT YOUR FOOD WOULD RUN OUT BEFORE YOU GOT MONEY TO BUY MORE.: NEVER TRUE

## 2024-04-29 NOTE — PROGRESS NOTES
[place] [address], [address]  [place] [address]  Dept: [phone]  Dept Fax:[phone]    Ramona Faustin is a 68 y.o. female who presents today for her medical conditions/complaints as notedbelow.  Ramona Faustin is c/o of Medication Check (6 month f/u )        Assessment/Plan:     1. Essential hypertension  Hypertension is asymptomatic and well-controlled at this time.  Continue with metoprolol and the spironolactone.  Labs ordered for 6 months.  - Comprehensive Metabolic Panel; Future    2. Mixed hyperlipidemia  Hyperlipidemia asymptomatic and controlled with the rosuvastatin.  Labs also ordered in 6 months.  - Lipid Panel; Future    3. Acquired hypothyroidism  Hypothyroidism asymptomatic.  Continue with the current levothyroxine.  Labs in 6 months.  - TSH; Future  - T4, Free; Future    4. Impaired fasting glucose  Doing great with diet and exercise.  Continue with healthy lifestyle changes.  - Comprehensive Metabolic Panel; Future    5. Abnormal results of liver function studies  Normal liver function tests at the last visit.  Recheck with other labs.  - Comprehensive Metabolic Panel; Future        Lab Results   Component Value Date    CHOL 210 (H) 10/17/2023    TRIG 192 10/17/2023    HDL 59 10/17/2023    ALT 19 10/17/2023    AST 24 10/17/2023     10/17/2023    K 4.4 10/17/2023     10/17/2023    CREATININE 0.7 10/17/2023    BUN 12 10/17/2023    CO2 25 10/17/2023    TSH 1.58 10/17/2023    GLUF 123 08/23/2018    LABA1C 5.9 05/01/2023    LABA1C 5.8 04/25/2022    LABA1C 6.2 03/18/2021       Return in about 6 months (around 10/29/2024) for Medication recheck, AWV.        Subjective:      HPI:     HPI    Enjoys her time in [place], Is walking and doing well. Has been trying to eat healthy.  Has been faithful on her 2-3 miles per day.     No CP, no SOB, no palpitations.     BP Readings from Last 3 Encounters:   04/29/24 130/82   10/30/23 132/80

## 2024-10-23 LAB
ALBUMIN/GLOBULIN RATIO: 1.9 G/DL
ALBUMIN: 4.9 G/DL (ref 3.5–5)
ALP BLD-CCNC: 82 UNITS/L (ref 38–126)
ALT SERPL-CCNC: 26 UNITS/L (ref 4–35)
ANION GAP SERPL CALCULATED.3IONS-SCNC: 6.7 MMOL/L (ref 3–11)
AST SERPL-CCNC: 31 UNITS/L (ref 14–36)
BILIRUB SERPL-MCNC: 0.4 MG/DL (ref 0.2–1.3)
BUN BLDV-MCNC: 12 MG/DL (ref 7–17)
CALCIUM SERPL-MCNC: 9.9 MG/DL (ref 8.4–10.2)
CHLORIDE BLD-SCNC: 105 MMOL/L (ref 98–120)
CHOLESTEROL, TOTAL: 216 MG/DL (ref 50–200)
CHOLESTEROL/HDL RATIO: 3.48 RATIO (ref 0–4.5)
CO2: 28 MMOL/L (ref 22–31)
CREAT SERPL-MCNC: 0.9 MG/DL (ref 0.5–1)
GFR, ESTIMATED: > 60
GLOBULIN: 2.6 G/DL
GLUCOSE: 104 MG/DL (ref 65–105)
HDLC SERPL-MCNC: 62 MG/DL (ref 36–68)
LDL CHOLESTEROL: 115.4 MG/DL (ref 0–160)
POTASSIUM SERPL-SCNC: 4.4 MMOL/L (ref 3.6–5)
SODIUM BLD-SCNC: 140 MMOL/L (ref 135–145)
T4 FREE: 1.03 NG/DL (ref 0.78–2.19)
TOTAL PROTEIN: 7.5 G/DL (ref 6.3–8.2)
TRIGL SERPL-MCNC: 193 MG/DL (ref 10–250)
TSH SERPL DL<=0.05 MIU/L-ACNC: 1.94 MIU/ML (ref 0.49–4.67)
VLDLC SERPL CALC-MCNC: 39 MG/DL (ref 0–50)

## 2024-10-23 SDOH — HEALTH STABILITY: PHYSICAL HEALTH: ON AVERAGE, HOW MANY DAYS PER WEEK DO YOU ENGAGE IN MODERATE TO STRENUOUS EXERCISE (LIKE A BRISK WALK)?: 6 DAYS

## 2024-10-23 SDOH — HEALTH STABILITY: PHYSICAL HEALTH: ON AVERAGE, HOW MANY MINUTES DO YOU ENGAGE IN EXERCISE AT THIS LEVEL?: 50 MIN

## 2024-10-23 ASSESSMENT — PATIENT HEALTH QUESTIONNAIRE - PHQ9
1. LITTLE INTEREST OR PLEASURE IN DOING THINGS: NOT AT ALL
2. FEELING DOWN, DEPRESSED OR HOPELESS: NOT AT ALL
SUM OF ALL RESPONSES TO PHQ QUESTIONS 1-9: 0
SUM OF ALL RESPONSES TO PHQ9 QUESTIONS 1 & 2: 0

## 2024-10-23 ASSESSMENT — LIFESTYLE VARIABLES
HOW OFTEN DO YOU HAVE SIX OR MORE DRINKS ON ONE OCCASION: 1
HOW OFTEN DO YOU HAVE A DRINK CONTAINING ALCOHOL: 2
HOW OFTEN DO YOU HAVE A DRINK CONTAINING ALCOHOL: MONTHLY OR LESS
HOW MANY STANDARD DRINKS CONTAINING ALCOHOL DO YOU HAVE ON A TYPICAL DAY: 1 OR 2
HOW MANY STANDARD DRINKS CONTAINING ALCOHOL DO YOU HAVE ON A TYPICAL DAY: 1

## 2024-10-31 ENCOUNTER — OFFICE VISIT (OUTPATIENT)
Dept: FAMILY MEDICINE CLINIC | Age: 69
End: 2024-10-31
Payer: MEDICARE

## 2024-10-31 VITALS
HEART RATE: 75 BPM | OXYGEN SATURATION: 97 % | BODY MASS INDEX: 30.22 KG/M2 | SYSTOLIC BLOOD PRESSURE: 136 MMHG | DIASTOLIC BLOOD PRESSURE: 84 MMHG | HEIGHT: 64 IN | WEIGHT: 177 LBS

## 2024-10-31 DIAGNOSIS — R73.01 IMPAIRED FASTING GLUCOSE: ICD-10-CM

## 2024-10-31 DIAGNOSIS — Z12.11 ENCOUNTER FOR SCREENING COLONOSCOPY: ICD-10-CM

## 2024-10-31 DIAGNOSIS — Z23 NEEDS FLU SHOT: ICD-10-CM

## 2024-10-31 DIAGNOSIS — Z00.00 MEDICARE ANNUAL WELLNESS VISIT, SUBSEQUENT: Primary | ICD-10-CM

## 2024-10-31 LAB — HBA1C MFR BLD: 5.8 %

## 2024-10-31 PROCEDURE — 90653 IIV ADJUVANT VACCINE IM: CPT | Performed by: FAMILY MEDICINE

## 2024-10-31 PROCEDURE — 83036 HEMOGLOBIN GLYCOSYLATED A1C: CPT | Performed by: FAMILY MEDICINE

## 2024-10-31 NOTE — PROGRESS NOTES
Have you had an allergic reaction to the flu (influenza) shot? no  Are you allergic to eggs or any component of the flu vaccine? no  Do you have a history of Guillain-Kenesaw Syndrome (GBS), which is paralysis after receiving the flu vaccine? no  Are you feeling well today? yes  Flu vaccine given as ordered.  Patient tolerated it well.  No questions re: VIS information.    After obtaining consent, and per orders of Linda Celaya MD, injection of FLU VACCINE given in Right deltoid by Mai Handley MA. Patient tolerated well.  Patient instructed to remain in clinic for 20 minutes afterwards, and to report any adverse reaction immediately.   
efforts, she has not observed any weight loss. She is currently on rosuvastatin for cholesterol management and metoprolol for blood pressure control. She reports no chest pain, breathing difficulties, or shortness of breath.    Patient's complete Health Risk Assessment and screening values have been reviewed and are found in Flowsheets. The following problems were reviewed today and where indicated follow up appointments were made and/or referrals ordered.    Positive Risk Factor Screenings with Interventions:                  Abnormal BMI (obese):  Body mass index is 30.86 kg/m². (!) Abnormal  Interventions:  Encouraged to stay as active as possible.  Work on the healthy diet.           Objective   Vitals:    10/31/24 0944   BP: 136/84   Site: Left Upper Arm   Position: Sitting   Cuff Size: Medium Adult   Pulse: 75   SpO2: 97%   Weight: 80.3 kg (177 lb)   Height: 1.613 m (5' 3.5\")      Body mass index is 30.86 kg/m².        Physical Exam  Vital Signs  Blood pressure reading is 136/84.    Physical Exam  Vitals and nursing note reviewed.   Constitutional:       Appearance: Normal appearance. She is well-developed.   HENT:      Head: Normocephalic and atraumatic.      Right Ear: External ear normal.      Left Ear: External ear normal.   Eyes:      Extraocular Movements: Extraocular movements intact.      Conjunctiva/sclera: Conjunctivae normal.   Neck:      Thyroid: No thyromegaly.      Vascular: No JVD.   Cardiovascular:      Rate and Rhythm: Normal rate and regular rhythm.      Pulses: Normal pulses.      Heart sounds: Normal heart sounds. No murmur heard.     No friction rub. No gallop.   Pulmonary:      Effort: Pulmonary effort is normal. No respiratory distress.      Breath sounds: Normal breath sounds.   Musculoskeletal:      Cervical back: Normal range of motion and neck supple.      Right lower leg: No edema.      Left lower leg: No edema.   Lymphadenopathy:      Cervical: No cervical adenopathy.   Skin:

## 2024-11-22 LAB — NONINV COLON CA DNA+OCC BLD SCRN STL QL: POSITIVE

## 2024-11-25 ENCOUNTER — TELEPHONE (OUTPATIENT)
Dept: SURGERY | Age: 69
End: 2024-11-25

## 2024-12-22 DIAGNOSIS — I10 ESSENTIAL HYPERTENSION: ICD-10-CM

## 2024-12-23 RX ORDER — SPIRONOLACTONE 25 MG/1
TABLET ORAL
Qty: 90 TABLET | Refills: 3 | Status: SHIPPED | OUTPATIENT
Start: 2024-12-23

## 2024-12-23 NOTE — TELEPHONE ENCOUNTER
Ramona Faustin is requesting a refill on the following medication(s):  Requested Prescriptions     Pending Prescriptions Disp Refills    spironolactone (ALDACTONE) 25 MG tablet [Pharmacy Med Name: Spironolactone 25 MG Oral Tablet] 90 tablet 0     Sig: Take 1 tablet by mouth once daily       Last Visit Date (If Applicable):  10/31/2024    Next Visit Date:    5/1/2025

## 2025-01-08 SDOH — ECONOMIC STABILITY: INCOME INSECURITY: IN THE LAST 12 MONTHS, WAS THERE A TIME WHEN YOU WERE NOT ABLE TO PAY THE MORTGAGE OR RENT ON TIME?: NO

## 2025-01-08 SDOH — ECONOMIC STABILITY: FOOD INSECURITY: WITHIN THE PAST 12 MONTHS, THE FOOD YOU BOUGHT JUST DIDN'T LAST AND YOU DIDN'T HAVE MONEY TO GET MORE.: NEVER TRUE

## 2025-01-08 SDOH — ECONOMIC STABILITY: FOOD INSECURITY: WITHIN THE PAST 12 MONTHS, YOU WORRIED THAT YOUR FOOD WOULD RUN OUT BEFORE YOU GOT MONEY TO BUY MORE.: NEVER TRUE

## 2025-01-08 ASSESSMENT — PATIENT HEALTH QUESTIONNAIRE - PHQ9
SUM OF ALL RESPONSES TO PHQ QUESTIONS 1-9: 0
2. FEELING DOWN, DEPRESSED OR HOPELESS: NOT AT ALL
1. LITTLE INTEREST OR PLEASURE IN DOING THINGS: NOT AT ALL
SUM OF ALL RESPONSES TO PHQ9 QUESTIONS 1 & 2: 0
2. FEELING DOWN, DEPRESSED OR HOPELESS: NOT AT ALL
SUM OF ALL RESPONSES TO PHQ9 QUESTIONS 1 & 2: 0
1. LITTLE INTEREST OR PLEASURE IN DOING THINGS: NOT AT ALL

## 2025-01-09 ENCOUNTER — OFFICE VISIT (OUTPATIENT)
Dept: FAMILY MEDICINE CLINIC | Age: 70
End: 2025-01-09
Payer: MEDICARE

## 2025-01-09 VITALS
SYSTOLIC BLOOD PRESSURE: 136 MMHG | BODY MASS INDEX: 30.51 KG/M2 | DIASTOLIC BLOOD PRESSURE: 84 MMHG | OXYGEN SATURATION: 98 % | WEIGHT: 175 LBS | HEART RATE: 58 BPM

## 2025-01-09 DIAGNOSIS — L23.7 ALLERGIC CONTACT DERMATITIS DUE TO PLANTS, EXCEPT FOOD: Primary | ICD-10-CM

## 2025-01-09 PROCEDURE — 99212 OFFICE O/P EST SF 10 MIN: CPT | Performed by: FAMILY MEDICINE

## 2025-01-09 RX ORDER — PREDNISONE 20 MG/1
TABLET ORAL
Qty: 13 TABLET | Refills: 0 | Status: SHIPPED | OUTPATIENT
Start: 2025-01-09

## 2025-01-09 RX ORDER — FLUOCINONIDE 0.5 MG/G
CREAM TOPICAL
Qty: 30 G | Refills: 0 | Status: SHIPPED | OUTPATIENT
Start: 2025-01-09

## 2025-01-09 NOTE — PROGRESS NOTES
[place] [address]  [place] [address]  Dept: [phone]  Dept Fax:[phone]    Ramona Faustin is a 69 y.o. female who presents today for her medical conditions/complaints as notedbelow.        Assessment/Plan:     Assessment & Plan  1. Dermatological rash.  The clinical presentation suggests an allergic reaction to an airborne substance, as the rash is confined to areas of skin exposure. The possibility of a food-related allergy is unlikely given the absence of symptoms on the abdomen, back, and legs, and the duration of the rash exceeding a few days. The condition does not appear to be contagious. It could be a bad case of poison ivy. She will start a regimen of cetirizine 10 mg once daily in the morning, supplemented with Benadryl 25 to 50 mg at bedtime. Additionally, famotidine 20 mg twice daily will be introduced. A steroid cream will be prescribed for application on the arms and neck, while a gentle moisturizer without fragrance will be recommended for facial use. A high-dose oral prednisone course will be initiated, starting with 20 mg tablets, 3 tablets for 2 days, 2 tablets for 2 days, 1 tablet for 2 days, and finally half a tablet for 2 days. This medication should be taken with food, preferably in the morning, to avoid potential sleep disturbances. She is advised to continue using ice packs for symptom relief. If there is no improvement in her condition, she should seek immediate medical attention.      Assessment & Plan  Allergic contact dermatitis due to plants, except food      Orders:  •  predniSONE (DELTASONE) 20 MG tablet; 3 tabs for 2 days, 2 tablets for 2 days, 1 tablet for 2 days and then a 1/2 tablet for 2 days. Take with food.  •  fluocinonide (LIDEX) 0.05 % cream; Apply topically 2 times daily.         Results      Lab Results   Component Value Date    CHOL 216 (H) 10/23/2024    TRIG 193 10/23/2024    HDL 62 10/23/2024    ALT 26

## 2025-01-09 NOTE — PATIENT INSTRUCTIONS
Start on the cetirizine 10 mg once daily in the morning - with the benadryl 25- 50 mg at bedtime.  Add in also famotidine 20 mg twice daily (pepcid).  Add in the steroid cream to the arms, hand and face.  Gentle moisturizer to the face.  Higher dose oral prednisone.

## 2025-02-04 DIAGNOSIS — L23.7 ALLERGIC CONTACT DERMATITIS DUE TO PLANTS, EXCEPT FOOD: ICD-10-CM

## 2025-02-04 RX ORDER — PREDNISONE 20 MG/1
TABLET ORAL
Qty: 13 TABLET | Refills: 0 | Status: SHIPPED | OUTPATIENT
Start: 2025-02-04

## 2025-02-04 NOTE — TELEPHONE ENCOUNTER
Refilled.  I do not want her to have to use prednisone every time she goes to a swim meet.  Make sure she takes cetirizine on the days the goes to the meets as well as a famotidine that morning and evening.  Should shower as soon as she comes home and moisturize.  Wear long sleeves and long pants also.

## 2025-02-04 NOTE — TELEPHONE ENCOUNTER
Patient called and states that she was seen at the beginning of January for a rash that was very itchy. Patient was prescribed Prednisone that helped clear the rash and symptoms. Patient states that she thinks that she is getting the rash from the chlorine the pool. Patient was at her [employer] swim meet on Thursday and on Friday she started to break out in a rash that is painful and itchy. Patient states that since Friday the rash is spreading and getting worse.     Patient is wondering if she needs to be seen again or if she can get a prescription sent in. If medication is able to get sent to [facility].    Per PCP patient does not need to be seen at this time and will send in a prescription.      Patient notified by phone.

## 2025-03-18 DIAGNOSIS — E78.2 MIXED HYPERLIPIDEMIA: ICD-10-CM

## 2025-03-18 DIAGNOSIS — I10 ESSENTIAL HYPERTENSION: ICD-10-CM

## 2025-03-18 DIAGNOSIS — E03.9 ACQUIRED HYPOTHYROIDISM: ICD-10-CM

## 2025-03-18 NOTE — TELEPHONE ENCOUNTER
Ramona Faustin is requesting a refill on the following medication(s):  Requested Prescriptions     Pending Prescriptions Disp Refills    levothyroxine (SYNTHROID) 50 MCG tablet [Pharmacy Med Name: Levothyroxine Sodium 50 MCG Oral Tablet] 90 tablet 0     Sig: Take 1 tablet by mouth once daily    rosuvastatin (CRESTOR) 10 MG tablet [Pharmacy Med Name: ROSUVASTATIN 10MG TAB] 90 tablet 0     Sig: Take 1 tablet by mouth nightly    metoprolol succinate (TOPROL XL) 50 MG extended release tablet [Pharmacy Med Name: Metoprolol Succinate ER 50 MG Oral Tablet Extended Release 24 Hour] 90 tablet 0     Sig: Take 1 tablet by mouth once daily       Last Visit Date (If Applicable):  1/9/2025    Next Visit Date:    5/5/2025

## 2025-03-19 RX ORDER — METOPROLOL SUCCINATE 50 MG/1
50 TABLET, EXTENDED RELEASE ORAL DAILY
Qty: 90 TABLET | Refills: 3 | Status: SHIPPED | OUTPATIENT
Start: 2025-03-19

## 2025-03-19 RX ORDER — ROSUVASTATIN CALCIUM 10 MG/1
10 TABLET, COATED ORAL NIGHTLY
Qty: 90 TABLET | Refills: 1 | Status: SHIPPED | OUTPATIENT
Start: 2025-03-19

## 2025-03-19 RX ORDER — LEVOTHYROXINE SODIUM 50 UG/1
50 TABLET ORAL DAILY
Qty: 90 TABLET | Refills: 1 | Status: SHIPPED | OUTPATIENT
Start: 2025-03-19

## 2025-05-05 ENCOUNTER — OFFICE VISIT (OUTPATIENT)
Dept: FAMILY MEDICINE CLINIC | Age: 70
End: 2025-05-05
Payer: MEDICARE

## 2025-05-05 VITALS
BODY MASS INDEX: 29.82 KG/M2 | WEIGHT: 171 LBS | OXYGEN SATURATION: 97 % | HEART RATE: 69 BPM | DIASTOLIC BLOOD PRESSURE: 70 MMHG | SYSTOLIC BLOOD PRESSURE: 136 MMHG

## 2025-05-05 DIAGNOSIS — E78.2 MIXED HYPERLIPIDEMIA: ICD-10-CM

## 2025-05-05 DIAGNOSIS — R94.5 ABNORMAL RESULTS OF LIVER FUNCTION STUDIES: ICD-10-CM

## 2025-05-05 DIAGNOSIS — R73.01 IMPAIRED FASTING GLUCOSE: ICD-10-CM

## 2025-05-05 DIAGNOSIS — L23.7 ALLERGIC CONTACT DERMATITIS DUE TO PLANTS, EXCEPT FOOD: ICD-10-CM

## 2025-05-05 DIAGNOSIS — E03.9 ACQUIRED HYPOTHYROIDISM: ICD-10-CM

## 2025-05-05 DIAGNOSIS — I10 ESSENTIAL HYPERTENSION: Primary | ICD-10-CM

## 2025-05-05 PROCEDURE — 99212 OFFICE O/P EST SF 10 MIN: CPT | Performed by: FAMILY MEDICINE

## 2025-05-05 RX ORDER — FLUOCINONIDE 0.5 MG/G
CREAM TOPICAL
Qty: 30 G | Refills: 0 | Status: SHIPPED | OUTPATIENT
Start: 2025-05-05

## 2025-05-05 NOTE — ASSESSMENT & PLAN NOTE
Chronic, at goal (stable), asx and well controlled    Orders:    Comprehensive Metabolic Panel; Future

## 2025-05-05 NOTE — ASSESSMENT & PLAN NOTE
Chronic, at goal (stable), Continue on the current thyroid dose    Orders:    TSH; Future    T4, Free; Future

## 2025-05-05 NOTE — PROGRESS NOTES
[facility]  [address], [address]  [place] [address]  Dept: [phone]  Dept Fax:[phone]    Ramona Faustin is a 69 y.o. female who presents today for her medical conditions/complaints as notedbelow.        Assessment/Plan:     Assessment & Plan  1. Rash.  - Reports a recurrent rash that was particularly bothersome yesterday.  - No specific exposure noted, but mentioned handling rhubarb without gloves recently.  - Advised to continue taking Zyrtec regularly and use Benadryl as needed. Prescription for Lidex cream will be sent to pharmacy, to be used only during flare-ups for a couple of days.  - Encouraged to wear long sleeves and gloves when handling plants and to use waterproof sunscreen when near pools or working outside. Recommended thorough showering after outdoor activities and avoiding overheating, such as in hot showers. Ice packs can be used to alleviate itching during flare-ups.      Assessment & Plan  Essential hypertension   Chronic, at goal (stable), asx and well controlled    Orders:    Comprehensive Metabolic Panel; Future    Mixed hyperlipidemia   Chronic, at goal (stable), continue on the current crestor    Orders:    Lipid Panel; Future    Acquired hypothyroidism   Chronic, at goal (stable), Continue on the current thyroid dose    Orders:    TSH; Future    T4, Free; Future    Abnormal results of liver function studies    Recheck and follow up    Orders:    Comprehensive Metabolic Panel; Future    Impaired fasting glucose    Continue with the healthy diet and exercise. Check labs    Allergic contact dermatitis due to plants, except food    Orders:    fluocinonide (LIDEX) 0.05 % cream; Apply topically 2 times daily.         Results      Lab Results   Component Value Date    CHOL 216 (H) 10/23/2024    TRIG 193 10/23/2024    HDL 62 10/23/2024    ALT 26 10/23/2024    AST 31 10/23/2024     10/23/2024    K 4.4 10/23/2024     10/23/2024

## 2025-05-31 DIAGNOSIS — L23.7 ALLERGIC CONTACT DERMATITIS DUE TO PLANTS, EXCEPT FOOD: ICD-10-CM

## 2025-06-02 RX ORDER — FLUOCINONIDE 0.5 MG/G
CREAM TOPICAL
Qty: 30 G | Refills: 5 | Status: SHIPPED | OUTPATIENT
Start: 2025-06-02

## 2025-06-02 NOTE — TELEPHONE ENCOUNTER
Ramona Faustin is requesting a refill on the following medication(s):  Requested Prescriptions     Pending Prescriptions Disp Refills    fluocinonide (LIDEX) 0.05 % cream [Pharmacy Med Name: Fluocinonide 0.05 % External Cream] 30 g 0     Sig: APPLY  CREAM EXTERNALLY TWICE DAILY       Last Visit Date (If Applicable):  5/5/2025      Next Visit Date:    11/6/2025